# Patient Record
Sex: FEMALE | Race: WHITE | NOT HISPANIC OR LATINO | Employment: FULL TIME | URBAN - METROPOLITAN AREA
[De-identification: names, ages, dates, MRNs, and addresses within clinical notes are randomized per-mention and may not be internally consistent; named-entity substitution may affect disease eponyms.]

---

## 2017-08-15 ENCOUNTER — HOSPITAL ENCOUNTER (INPATIENT)
Facility: CLINIC | Age: 57
LOS: 2 days | Discharge: HOME | DRG: 603 | End: 2017-08-18
Attending: EMERGENCY MEDICINE | Admitting: HOSPITALIST
Payer: COMMERCIAL

## 2017-08-15 ENCOUNTER — NURSE TRIAGE (OUTPATIENT)
Dept: NURSING | Facility: CLINIC | Age: 57
End: 2017-08-15

## 2017-08-15 DIAGNOSIS — Z96.651 PRESENCE OF RIGHT ARTIFICIAL KNEE JOINT: ICD-10-CM

## 2017-08-15 DIAGNOSIS — L03.115 CELLULITIS OF RIGHT LOWER EXTREMITY: ICD-10-CM

## 2017-08-15 DIAGNOSIS — L01.00 IMPETIGO: Primary | ICD-10-CM

## 2017-08-15 DIAGNOSIS — R23.8 VESICULAR ERUPTION OF SKIN: ICD-10-CM

## 2017-08-15 DIAGNOSIS — L30.9 DERMATITIS: ICD-10-CM

## 2017-08-15 LAB
ANION GAP SERPL CALCULATED.3IONS-SCNC: 6 MMOL/L (ref 3–14)
BASOPHILS # BLD AUTO: 0 10E9/L (ref 0–0.2)
BASOPHILS NFR BLD AUTO: 0.4 %
BUN SERPL-MCNC: 13 MG/DL (ref 7–30)
CALCIUM SERPL-MCNC: 7.3 MG/DL (ref 8.5–10.1)
CHLORIDE SERPL-SCNC: 114 MMOL/L (ref 94–109)
CO2 SERPL-SCNC: 23 MMOL/L (ref 20–32)
CREAT SERPL-MCNC: 0.62 MG/DL (ref 0.52–1.04)
CRP SERPL-MCNC: 3.7 MG/L (ref 0–8)
DIFFERENTIAL METHOD BLD: NORMAL
EOSINOPHIL # BLD AUTO: 0.3 10E9/L (ref 0–0.7)
EOSINOPHIL NFR BLD AUTO: 5.2 %
ERYTHROCYTE [DISTWIDTH] IN BLOOD BY AUTOMATED COUNT: 13.1 % (ref 10–15)
ERYTHROCYTE [SEDIMENTATION RATE] IN BLOOD BY WESTERGREN METHOD: 69 MM/H (ref 0–30)
GFR SERPL CREATININE-BSD FRML MDRD: >90 ML/MIN/1.7M2
GLUCOSE SERPL-MCNC: 72 MG/DL (ref 70–99)
HCT VFR BLD AUTO: 37.3 % (ref 35–47)
HGB BLD-MCNC: 12.1 G/DL (ref 11.7–15.7)
IMM GRANULOCYTES # BLD: 0 10E9/L (ref 0–0.4)
IMM GRANULOCYTES NFR BLD: 0.2 %
LYMPHOCYTES # BLD AUTO: 1.9 10E9/L (ref 0.8–5.3)
LYMPHOCYTES NFR BLD AUTO: 34.3 %
MCH RBC QN AUTO: 30.7 PG (ref 26.5–33)
MCHC RBC AUTO-ENTMCNC: 32.4 G/DL (ref 31.5–36.5)
MCV RBC AUTO: 95 FL (ref 78–100)
MONOCYTES # BLD AUTO: 0.4 10E9/L (ref 0–1.3)
MONOCYTES NFR BLD AUTO: 6.4 %
NEUTROPHILS # BLD AUTO: 3 10E9/L (ref 1.6–8.3)
NEUTROPHILS NFR BLD AUTO: 53.5 %
NRBC # BLD AUTO: 0 10*3/UL
NRBC BLD AUTO-RTO: 0 /100
PLATELET # BLD AUTO: 286 10E9/L (ref 150–450)
POTASSIUM SERPL-SCNC: 3.1 MMOL/L (ref 3.4–5.3)
PROCALCITONIN SERPL-MCNC: <0.05 NG/ML
RBC # BLD AUTO: 3.94 10E12/L (ref 3.8–5.2)
SODIUM SERPL-SCNC: 143 MMOL/L (ref 133–144)
WBC # BLD AUTO: 5.6 10E9/L (ref 4–11)

## 2017-08-15 PROCEDURE — 85025 COMPLETE CBC W/AUTO DIFF WBC: CPT | Performed by: EMERGENCY MEDICINE

## 2017-08-15 PROCEDURE — 84145 PROCALCITONIN (PCT): CPT | Performed by: EMERGENCY MEDICINE

## 2017-08-15 PROCEDURE — 85652 RBC SED RATE AUTOMATED: CPT | Performed by: EMERGENCY MEDICINE

## 2017-08-15 PROCEDURE — 86140 C-REACTIVE PROTEIN: CPT | Performed by: EMERGENCY MEDICINE

## 2017-08-15 PROCEDURE — 99285 EMERGENCY DEPT VISIT HI MDM: CPT | Mod: Z6 | Performed by: EMERGENCY MEDICINE

## 2017-08-15 PROCEDURE — 99285 EMERGENCY DEPT VISIT HI MDM: CPT | Mod: 25 | Performed by: EMERGENCY MEDICINE

## 2017-08-15 PROCEDURE — 80048 BASIC METABOLIC PNL TOTAL CA: CPT | Performed by: EMERGENCY MEDICINE

## 2017-08-15 NOTE — IP AVS SNAPSHOT
[address] [address]    Phone:  [phone]                                       After Visit Summary   8/15/2017    Alissa Escoto    MRN: [id number]           After Visit Summary Signature Page     I have received my discharge instructions, and my questions have been answered. I have discussed any challenges I see with this plan with the nurse or doctor.    ..........................................................................................................................................  Patient/Patient Representative Signature      ..........................................................................................................................................  Patient Representative Print Name and Relationship to Patient    ..................................................               ................................................  Date                                            Time    ..........................................................................................................................................  Reviewed by Signature/Title    ...................................................              ..............................................  Date                                                            Time

## 2017-08-15 NOTE — LETTER
Transition Communication Hand-off for Care Transitions to Next Level of Care Provider    Name: Alissa Escoto  MRN #: [id number]  Primary Care Provider: INGRIS WALTERS     Primary Clinic: [address] [address]     Reason for Hospitalization:  Cellulitis of right lower extremity [L03.115]  Admit Date/Time: 8/15/2017  9:21 PM  Discharge Date: 8/18/17  Payor Source: Payor: UCARE / Plan: UCARE [place] / Product Type: HMO /     Readmission Assessment Measure (ZAMZAM) Risk Score/category: medium    Reason for Communication Hand-off Referral: Multiple providers/specialties    Discharge Plan: home       Concern for non-adherence with plan of care:   Y/N no  Already enrolled in Tele-monitoring program and name of program:  no  Follow-up specialty is recommended: Yes    Follow-up plan:  No future appointments made.    Any outstanding tests or procedures:        Referrals     Future Labs/Procedures    Physical Therapy Referral     Comments:    [facility]: [facility] [place], operated by [facility] provides Physical Therapy evaluation and treatment and many specialty services across the [facility].  If requesting a specialty program, please choose from the list below.    If you have not heard from the scheduling office within 2 business days, please call [phone] for all locations.  Treatment: Evaluation & Treatment.  Resume previous orders  Special Instructions/Modalities: none  Special Programs: None    Please be aware that coverage of these services is subject to the terms and limitations of your health insurance plan.  Call member services at your health plan with any benefit or coverage questions.            Key Recommendations:  See AVS    Ariana Henning RN, BSN  Care Coordinator [facility] [place] [address]  Pager: [phone]  Phone: [phone]    AVS/Discharge Summary is the source of truth; this is a helpful guide for improved communication of patient story

## 2017-08-15 NOTE — IP AVS SNAPSHOT
MRN:[id number]                      After Visit Summary   8/15/2017    Alissa Escoto    MRN: [id number]           Thank you!     Thank you for choosing [facility] for your care. Our goal is always to provide you with excellent care. Hearing back from our patients is one way we can continue to improve our services. Please take a few minutes to complete the written survey that you may receive in the mail after you visit with us. Thank you!        Patient Information     Date Of Birth          1960        Designated Caregiver       Most Recent Value    Caregiver    Will someone help with your care after discharge? yes    Name of designated caregiver Mónica La    Phone number of caregiver [phone]    Caregiver address Unknown      About your hospital stay     You were admitted on:  August 16, 2017 You last received care in the:  [address] [facility]    You were discharged on:  August 18, 2017        Reason for your hospital stay       Dear Alissa Escoto    Your were hospitalized at [facility] with a contact dermatitis and bacterial infection on the right leg. You were treated with topical antibiotics and steroid cream. Overyour hospitalization you improved and today you are ready to be discharged. If you develop fever, shortness of breath, light headedness, worsening rash, or chest pain please seek medical attention.    We are suggesting the following medication changes:  1) Apply Dankins solution on a soaked gauze for 20 minutes, two times daily on your affected skin.   2) Apply mupirocin ointment on your skin lesions three times a day until the lesions resolve.  3) Apply triamcinolone ointment two times daily, until your lesions resolve.   4) Apply Aquaphor daily on to your affected skin.     Please set up an appointment with:  Your Primary Care doctor in 1 week for hospital follow up     It was a pleasure meeting with you today. Thank you for allowing me and my  team the privilege of caring for you today. You are the reason we are here, and I truly hope we provided you with the excellent service you deserve. Please let us know if there is anything else we can do for you so that we can be sure you are leaving completely satisfied with your care experience.    Take care!  Arnaldo Martinez MD  Department of [facility]  [facility]                  Who to Call     For medical emergencies, please call [phone].  For non-urgent questions about your medical care, please call your primary care provider or clinic, [phone]          Attending Provider     Provider Specialty    Lidia Garrido MD Emergency Medicine    Khot, Elias Davenport MD Internal Medicine    Jeter, Minh Singh MD Internal Medicine       Primary Care Provider Office Phone # Fax #    Ingris Levi [phone]      After Care Instructions     Activity       Your activity upon discharge: activity as tolerated            Diet       Follow this diet upon discharge:       Combination Diet Regular Diet Adult                  Follow-up Appointments     Adult [facility] Follow-up and recommended labs and tests       Follow up with primary care provider, INGRIS LEVI, within 7 days for hospital follow- up.        Appointments on [place] and/or [facility] (with [facility] or [facility] provider or service). Call [phone] if you haven't heard regarding these appointments within 7 days of discharge.                  Additional Services     Physical Therapy Referral       Critical access hospital: [facility] provides Physical Therapy evaluation and treatment and many specialty services across the [facility] system.  If requesting a specialty program, please choose from the list below.    If you have not heard from the scheduling office within 2 business days, please call [phone] for all locations.  Treatment: Evaluation & Treatment.  Resume  "previous orders  Special Instructions/Modalities: none  Special Programs: None    Please be aware that coverage of these services is subject to the terms and limitations of your health insurance plan.  Call member services at your health plan with any benefit or coverage questions.                  Pending Results     No orders found from 8/13/2017 to 8/16/2017.            Statement of Approval     Ordered          08/18/17 1408  I have reviewed and agree with all the recommendations and orders detailed in this document.  EFFECTIVE NOW     Approved and electronically signed by:  Arnaldo Martinez MD             Admission Information     Date & Time Provider Department Dept. Phone    8/15/2017 Minh Jeter MD [address] [facility] [place] [phone]      Your Vitals Were     Blood Pressure Pulse Temperature Respirations Height Weight    114/67 (BP Location: Right arm) 86 97  F (36.1  C) (Oral) 16 1.676 m (5' 6\") 106 kg (233 lb 11 oz)    Pulse Oximetry BMI (Body Mass Index)                98% 37.72 kg/m2          Care EveryWhere ID     This is your Care EveryWhere ID. This could be used by other organizations to access your [facility] medical records  [id number]        Equal Access to Services     LEXI STEINER : Hadii aubrie Rocha, wadon lim, qayblorena kazully mora, farideh paula . So [facility] [phone].    ATENCIÓN: Si habla español, tiene a wilkinson disposición servicios gratuitos de asistencia lingüística. [facility] [phone].    We comply with applicable federal civil rights laws and [place] laws. We do not discriminate on the basis of race, color, national origin, age, disability sex, sexual orientation or gender identity.               Review of your medicines      START taking        Dose / Directions    mineral oil-hydrophilic petrolatum   Used for:  Impetigo        Apply to right leg daily   Quantity:  50 g   Refills:  0       mupirocin 2 % ointment   Commonly " known as:  BACTROBAN   Used for:  Impetigo        Apply a thin layer  to affected skin 3 times a day, until lesions resolve   Quantity:  15 g   Refills:  0       sodium hypochlorite external solution   Commonly known as:  DAKINS   Used for:  Impetigo        Apply soaked gauze to the affected areas on the right leg for 20 minutes, two times daily until lesions resolve   Quantity:  473 mL   Refills:  0       triamcinolone 0.1 % ointment   Commonly known as:  KENALOG   Used for:  Dermatitis        Apply to the affected skin on the right knee two times daily until lesions resolve.   Quantity:  15 g   Refills:  0         CONTINUE these medicines which have NOT CHANGED        Dose / Directions    CLONAZEPAM PO        Dose:  0.5-1 mg   Take 0.5-1 mg by mouth 2 times daily as needed for anxiety   Refills:  0       LEVOTHYROXINE SODIUM PO        Dose:  88 mcg   Take 88 mcg by mouth daily   Refills:  0       SIMVASTATIN PO        Dose:  40 mg   Take 40 mg by mouth At Bedtime   Refills:  0       TRAMADOL HCL PO        Dose:   mg   Take  mg by mouth 4 times daily as needed   Refills:  0         STOP taking     CLINDAMYCIN HCL PO                Where to get your medicines      These medications were sent to [id number] - [place], [place] - [address]  [address] [address]     Phone:  [phone]     mineral oil-hydrophilic petrolatum    mupirocin 2 % ointment    sodium hypochlorite external solution    triamcinolone 0.1 % ointment                Protect others around you: Learn how to safely use, store and throw away your medicines at www.disposemymeds.org.             Medication List: This is a list of all your medications and when to take them. Check marks below indicate your daily home schedule. Keep this list as a reference.      Medications           Morning Afternoon Evening Bedtime As Needed    CLONAZEPAM PO   Take 0.5-1 mg by mouth 2 times daily as needed for anxiety                                 LEVOTHYROXINE SODIUM PO   Take 88 mcg by mouth daily   Last time this was given:  88 mcg on 8/18/2017  7:58 AM                                mineral oil-hydrophilic petrolatum   Apply to right leg daily   Last time this was given:  8/18/2017  8:03 AM                                mupirocin 2 % ointment   Commonly known as:  BACTROBAN   Apply a thin layer  to affected skin 3 times a day, until lesions resolve   Last time this was given:  8/18/2017  8:03 AM                                SIMVASTATIN PO   Take 40 mg by mouth At Bedtime                                sodium hypochlorite external solution   Commonly known as:  DAKINS   Apply soaked gauze to the affected areas on the right leg for 20 minutes, two times daily until lesions resolve   Last time this was given:  8/18/2017  8:05 AM                                TRAMADOL HCL PO   Take  mg by mouth 4 times daily as needed   Last time this was given:  25 mg on 8/16/2017 10:55 PM                                triamcinolone 0.1 % ointment   Commonly known as:  KENALOG   Apply to the affected skin on the right knee two times daily until lesions resolve.   Last time this was given:  8/18/2017  8:02 AM

## 2017-08-16 ENCOUNTER — APPOINTMENT (OUTPATIENT)
Dept: ULTRASOUND IMAGING | Facility: CLINIC | Age: 57
DRG: 603 | End: 2017-08-16
Attending: EMERGENCY MEDICINE
Payer: COMMERCIAL

## 2017-08-16 ENCOUNTER — APPOINTMENT (OUTPATIENT)
Dept: GENERAL RADIOLOGY | Facility: CLINIC | Age: 57
DRG: 603 | End: 2017-08-16
Attending: STUDENT IN AN ORGANIZED HEALTH CARE EDUCATION/TRAINING PROGRAM
Payer: COMMERCIAL

## 2017-08-16 PROBLEM — L03.90 CELLULITIS: Status: ACTIVE | Noted: 2017-08-16

## 2017-08-16 LAB
ANION GAP SERPL CALCULATED.3IONS-SCNC: 7 MMOL/L (ref 3–14)
BUN SERPL-MCNC: 14 MG/DL (ref 7–30)
CALCIUM SERPL-MCNC: 9.4 MG/DL (ref 8.5–10.1)
CHLORIDE SERPL-SCNC: 108 MMOL/L (ref 94–109)
CO2 SERPL-SCNC: 28 MMOL/L (ref 20–32)
CREAT SERPL-MCNC: 0.71 MG/DL (ref 0.52–1.04)
ERYTHROCYTE [DISTWIDTH] IN BLOOD BY AUTOMATED COUNT: 13.3 % (ref 10–15)
GFR SERPL CREATININE-BSD FRML MDRD: 84 ML/MIN/1.7M2
GLUCOSE SERPL-MCNC: 101 MG/DL (ref 70–99)
GRAM STN SPEC: NORMAL
HCT VFR BLD AUTO: 36.4 % (ref 35–47)
HGB BLD-MCNC: 11.7 G/DL (ref 11.7–15.7)
MCH RBC QN AUTO: 30.2 PG (ref 26.5–33)
MCHC RBC AUTO-ENTMCNC: 32.1 G/DL (ref 31.5–36.5)
MCV RBC AUTO: 94 FL (ref 78–100)
PLATELET # BLD AUTO: 280 10E9/L (ref 150–450)
POTASSIUM SERPL-SCNC: 4 MMOL/L (ref 3.4–5.3)
RBC # BLD AUTO: 3.87 10E12/L (ref 3.8–5.2)
SODIUM SERPL-SCNC: 142 MMOL/L (ref 133–144)
SPECIMEN SOURCE: NORMAL
SPECIMEN SOURCE: NORMAL
WBC # BLD AUTO: 5.3 10E9/L (ref 4–11)

## 2017-08-16 PROCEDURE — 25000132 ZZH RX MED GY IP 250 OP 250 PS 637: Performed by: STUDENT IN AN ORGANIZED HEALTH CARE EDUCATION/TRAINING PROGRAM

## 2017-08-16 PROCEDURE — 93971 EXTREMITY STUDY: CPT | Mod: RT

## 2017-08-16 PROCEDURE — 12000008 ZZH R&B INTERMEDIATE UMMC

## 2017-08-16 PROCEDURE — 85027 COMPLETE CBC AUTOMATED: CPT | Performed by: STUDENT IN AN ORGANIZED HEALTH CARE EDUCATION/TRAINING PROGRAM

## 2017-08-16 PROCEDURE — 80048 BASIC METABOLIC PNL TOTAL CA: CPT | Performed by: STUDENT IN AN ORGANIZED HEALTH CARE EDUCATION/TRAINING PROGRAM

## 2017-08-16 PROCEDURE — 87205 SMEAR GRAM STAIN: CPT | Performed by: EMERGENCY MEDICINE

## 2017-08-16 PROCEDURE — 87070 CULTURE OTHR SPECIMN AEROBIC: CPT | Performed by: EMERGENCY MEDICINE

## 2017-08-16 PROCEDURE — 87070 CULTURE OTHR SPECIMN AEROBIC: CPT | Performed by: DERMATOLOGY

## 2017-08-16 PROCEDURE — 25000128 H RX IP 250 OP 636: Performed by: EMERGENCY MEDICINE

## 2017-08-16 PROCEDURE — 36415 COLL VENOUS BLD VENIPUNCTURE: CPT | Performed by: STUDENT IN AN ORGANIZED HEALTH CARE EDUCATION/TRAINING PROGRAM

## 2017-08-16 PROCEDURE — 25000128 H RX IP 250 OP 636: Performed by: STUDENT IN AN ORGANIZED HEALTH CARE EDUCATION/TRAINING PROGRAM

## 2017-08-16 PROCEDURE — 40000986 XR KNEE RT 1 /2 VW: Mod: RT

## 2017-08-16 PROCEDURE — 96365 THER/PROPH/DIAG IV INF INIT: CPT | Performed by: EMERGENCY MEDICINE

## 2017-08-16 PROCEDURE — 25000132 ZZH RX MED GY IP 250 OP 250 PS 637: Performed by: EMERGENCY MEDICINE

## 2017-08-16 PROCEDURE — 99223 1ST HOSP IP/OBS HIGH 75: CPT | Mod: AI | Performed by: INTERNAL MEDICINE

## 2017-08-16 RX ORDER — ALBUTEROL SULFATE 0.83 MG/ML
2.5 SOLUTION RESPIRATORY (INHALATION) EVERY 6 HOURS PRN
Status: DISCONTINUED | OUTPATIENT
Start: 2017-08-16 | End: 2017-08-18 | Stop reason: HOSPADM

## 2017-08-16 RX ORDER — LEVOTHYROXINE SODIUM 88 UG/1
88 TABLET ORAL DAILY
Status: DISCONTINUED | OUTPATIENT
Start: 2017-08-16 | End: 2017-08-18 | Stop reason: HOSPADM

## 2017-08-16 RX ORDER — OXYCODONE HYDROCHLORIDE 5 MG/1
5 TABLET ORAL ONCE
Status: DISCONTINUED | OUTPATIENT
Start: 2017-08-16 | End: 2017-08-18 | Stop reason: HOSPADM

## 2017-08-16 RX ORDER — AMOXICILLIN 250 MG
1-2 CAPSULE ORAL 2 TIMES DAILY PRN
Status: DISCONTINUED | OUTPATIENT
Start: 2017-08-16 | End: 2017-08-18 | Stop reason: HOSPADM

## 2017-08-16 RX ORDER — NALOXONE HYDROCHLORIDE 0.4 MG/ML
.1-.4 INJECTION, SOLUTION INTRAMUSCULAR; INTRAVENOUS; SUBCUTANEOUS
Status: DISCONTINUED | OUTPATIENT
Start: 2017-08-16 | End: 2017-08-16

## 2017-08-16 RX ORDER — POTASSIUM CHLORIDE 1.5 G/1.58G
20 POWDER, FOR SOLUTION ORAL ONCE
Status: COMPLETED | OUTPATIENT
Start: 2017-08-16 | End: 2017-08-16

## 2017-08-16 RX ORDER — NALOXONE HYDROCHLORIDE 0.4 MG/ML
.1-.4 INJECTION, SOLUTION INTRAMUSCULAR; INTRAVENOUS; SUBCUTANEOUS
Status: DISCONTINUED | OUTPATIENT
Start: 2017-08-16 | End: 2017-08-18 | Stop reason: HOSPADM

## 2017-08-16 RX ORDER — DIPHENHYDRAMINE HCL 25 MG
25 CAPSULE ORAL ONCE
Status: COMPLETED | OUTPATIENT
Start: 2017-08-16 | End: 2017-08-16

## 2017-08-16 RX ORDER — TRIAMCINOLONE ACETONIDE 1 MG/G
OINTMENT TOPICAL 2 TIMES DAILY
Status: DISCONTINUED | OUTPATIENT
Start: 2017-08-16 | End: 2017-08-18 | Stop reason: HOSPADM

## 2017-08-16 RX ORDER — MINERAL OIL/HYDROPHIL PETROLAT
OINTMENT (GRAM) TOPICAL DAILY
Status: DISCONTINUED | OUTPATIENT
Start: 2017-08-16 | End: 2017-08-18 | Stop reason: HOSPADM

## 2017-08-16 RX ORDER — DIPHENHYDRAMINE HCL 25 MG
25 CAPSULE ORAL EVERY 6 HOURS PRN
Status: DISCONTINUED | OUTPATIENT
Start: 2017-08-16 | End: 2017-08-18 | Stop reason: HOSPADM

## 2017-08-16 RX ORDER — ACETAMINOPHEN 325 MG/1
650 TABLET ORAL EVERY 4 HOURS PRN
Status: DISCONTINUED | OUTPATIENT
Start: 2017-08-16 | End: 2017-08-18 | Stop reason: HOSPADM

## 2017-08-16 RX ORDER — IBUPROFEN 200 MG
400 TABLET ORAL ONCE
Status: DISCONTINUED | OUTPATIENT
Start: 2017-08-16 | End: 2017-08-18 | Stop reason: HOSPADM

## 2017-08-16 RX ADMIN — LEVOTHYROXINE SODIUM 88 MCG: 88 TABLET ORAL at 08:41

## 2017-08-16 RX ADMIN — POTASSIUM CHLORIDE 20 MEQ: 1.5 POWDER, FOR SOLUTION ORAL at 01:08

## 2017-08-16 RX ADMIN — TRAMADOL HYDROCHLORIDE 25 MG: 50 TABLET, FILM COATED ORAL at 10:45

## 2017-08-16 RX ADMIN — VANCOMYCIN HYDROCHLORIDE 1750 MG: 10 INJECTION, POWDER, LYOPHILIZED, FOR SOLUTION INTRAVENOUS at 02:28

## 2017-08-16 RX ADMIN — TRAMADOL HYDROCHLORIDE 25 MG: 50 TABLET, FILM COATED ORAL at 15:50

## 2017-08-16 RX ADMIN — DIPHENHYDRAMINE HYDROCHLORIDE 25 MG: 25 CAPSULE ORAL at 17:07

## 2017-08-16 RX ADMIN — ENOXAPARIN SODIUM 40 MG: 40 INJECTION SUBCUTANEOUS at 08:41

## 2017-08-16 RX ADMIN — VANCOMYCIN HYDROCHLORIDE 1750 MG: 10 INJECTION, POWDER, LYOPHILIZED, FOR SOLUTION INTRAVENOUS at 14:39

## 2017-08-16 RX ADMIN — TRAMADOL HYDROCHLORIDE 25 MG: 50 TABLET, FILM COATED ORAL at 22:55

## 2017-08-16 ASSESSMENT — ENCOUNTER SYMPTOMS
HEMATURIA: 0
ROS SKIN COMMENTS: SEE HPI
DYSURIA: 0
POLYDIPSIA: 0
HEADACHES: 0
BRUISES/BLEEDS EASILY: 0
NAUSEA: 0
LIGHT-HEADEDNESS: 0
DIZZINESS: 0
NECK PAIN: 0
SHORTNESS OF BREATH: 0
TROUBLE SWALLOWING: 0
VOICE CHANGE: 0
DYSPHORIC MOOD: 0
CONSTIPATION: 0
WEAKNESS: 0
NERVOUS/ANXIOUS: 0
SORE THROAT: 0
FREQUENCY: 0
ABDOMINAL PAIN: 0
PALPITATIONS: 0
NUMBNESS: 0
DIARRHEA: 0
FEVER: 0
EYE PAIN: 0
VOMITING: 0
ADENOPATHY: 0
BACK PAIN: 0
BLOOD IN STOOL: 0
CHILLS: 0
DIAPHORESIS: 0
COUGH: 0

## 2017-08-16 NOTE — SUMMARY OF CARE
Patient arrived to unit from ED at 0330. Pt arrived with the following items: blue sergio shorts, socks, bra, gray shirt, purse (cell phone and charging cord, medical cards, wallet), blue cane, and glasses.

## 2017-08-16 NOTE — CONSULTS
[facility] Orthopedic Consultation    Alissa Escoto MRN# [id number]   Age: 57 year old YOB: 1960     Date of Admission:  8/15/2017    Reason for consult:  right knee pain with blisters        Requesting physician: Juan Chaudhari MD                   Impression and Recommendation (Resident / Clinician):   Impression:  57-year-old female status post right total knee arthroplasty on 07/05/17 at [facility] with right knee pain and multiple superficial blisters. Given healed incision and normal inflammatory markers, no concern for prosthetic joint infection. Swelling, pain, and stiffness are all within normal limits after TKA 5 weeks ago.     Recomendations:  -WBAT RLE  -R knee ROM with PT  -PT/OT  -wound cares for blisters per primary team  -FU Dr. Burnett outpatient as scheduled    Patient discussed with Dr. Damon. Patient s/p R TKA 5 weeks ago with pain, swelling, erythema, stiffness of knee. Incision healing well with no drainage, normal inflammatory markers. Normal post operative course, no concern for prosthetic infection. Superficial blisters can be managed by wound care team and will likely resolve with dressing changes and elevation/compression dressing to decrease swelling. FU outpatient with Dr. Burnett with [facility] as scheduled.    Lexie Villagran MD          Chief Complaint:   Right knee plain and skin blisters      History is obtained from the patient         History of Present Illness (Resident / Clinician):   This patient is a 57 year old female with right total knee arthroplasty on (07/05/17 by Dr. Burnett at [facility])  who presents with right knee pain and multiple blisters. Patient reported that knee was red and irritated immediately after surgery. She attributes this to the use of a Betadine wash when she has a known Betadine allergy. She reported doing well in the postoperative period and was discharged on 07/10/17 with no issues. On 07/11/17 she noted a blister  developing distal and medial to her incision. In the subsequent weeks multiple other blisters began to form. Thress weeks ago she was seen in the emergency department she was told that she would did not have a joint infection. Two weeks ago she was seen by her primary care doctor who cultured one of the blisters. The culture grew methicillin sensitive staph aureus and she was started on 900 of clindamycin daily. She is continuing to take those antibiotics through today. Per patient, she was seen by Dr. Burnett yesterday who did not have concern for joint infection.     Patient comes in today after a new blister had formed distal to the incision site. She is concerned that she has not been getting the medical care that she needs desires a second opinion. She reports ongoing superficial pain. Otherwise, her pain is very similar to the postoperative pain she experienced with her left knee total arthroplasty. She reports slightly decreased range of motion in the knee. She is continued to ambulate extensively on the leg fully minimal pain. She denies systemic symptoms such as fevers, aches, chills, myalgias.          Past Medical History:   Hashimoto's thyroid disease, bronchiectasis, high cholesterol          Past Surgical History:   Hysterectomy in 1990  Bladder reconstruction 1995  Cholecystectomy 1996  Left unicompartmental knee replacement 2014  Revision left total knee arthroplasty 2016  Right total knee arthroplasty - July 2017         Social History:   Anticipating starting a new job working with [employer] on 28 August. Patient anticipating that I will be fairly physical. Concerned about being able to start work. Patient does not smoke.    Social History   Substance Use Topics     Smoking status: Never Smoker     Smokeless tobacco: Not on file     Alcohol use No          Family History:   No family history of anesthesia, bleeding or clotting complications.          Immunizations:   Immunizations are  "up to date          Allergies:     Allergies   Allergen Reactions     Sulfa Drugs Anaphylaxis     Betadine [Povidone Iodine] Rash     Blisters     Iodine Rash             Medications:     Current Facility-Administered Medications   Medication     vancomycin (VANCOCIN) 1,750 mg in NaCl 0.9 % 500 mL intermittent infusion     levothyroxine (SYNTHROID/LEVOTHROID) tablet 88 mcg     enoxaparin (LOVENOX) injection 40 mg     acetaminophen (TYLENOL) tablet 650 mg     naloxone (NARCAN) injection 0.1-0.4 mg     senna-docusate (SENOKOT-S;PERICOLACE) 8.6-50 MG per tablet 1-2 tablet     traMADol (ULTRAM) half-tab 25 mg     diphenhydrAMINE (BENADRYL) capsule 25 mg     albuterol neb solution 2.5 mg             Review of Systems:   CONSTITUTIONAL:  negative for  fevers, chills, sweats, fatigue  HEENT:  negative for nasal congestion, sore throat   RESPIRATORY:  negative for  dyspnea, wheezing, chest pain and cough  CARDIOVASCULAR:  negative for  chest pain, palpitations, orthopnea, edema  GASTROINTESTINAL:  negative for nausea, vomiting, diarrhea, abdominal pain  GENITOURINARY:  negative for frequency, dysuria  HEMATOLOGIC/LYMPHATIC:  negative for easy bruising, bleeding  ALLERGIC/IMMUNOLOGIC:  History of allergic reactions, rashes  MUSCULOSKELETAL:  See history of present illness         Physical Exam:   Patient Vitals for the past 24 hrs:   BP Temp Temp src Pulse Heart Rate Resp SpO2 Height Weight   08/16/17 0730 122/67 97.4  F (36.3  C) Oral 76 - 16 96 % - -   08/16/17 0427 117/65 96.7  F (35.9  C) Oral - 79 16 93 % - -   08/16/17 0335 121/79 97  F (36.1  C) Oral 90 - 16 95 % 1.676 m (5' 6\") 103.8 kg (228 lb 14.4 oz)   08/16/17 0300 140/83 - - - - - 93 % - -   08/16/17 0230 130/81 - - - - - - - -   08/16/17 0200 136/82 - - - - - 95 % - -   08/16/17 0130 129/76 - - - - - 90 % - -   08/16/17 0100 119/88 - - - - - 100 % - -   08/15/17 2330 126/84 - - - - - 100 % - -   08/15/17 2300 109/70 - - - - - 98 % - -   08/15/17 2230 120/86 - - " "- - - 97 % - -   08/15/17 2200 (!) 127/91 - - - - - 96 % - -   08/15/17 2130 134/90 - - - - - 100 % - -   08/15/17 2111 (!) 141/91 98.2  F (36.8  C) Oral - 89 14 94 % 1.727 m (5' 8\") 104.3 kg (230 lb)     All vitals have been reviewed  General: awake, alert, cooperative, no apparent distress  HEENT: normocephalic, atraumatic  Respiratory: breathing non-labored, no wheezing  Cardiovascular: peripheral pulses 2+ and symmetric, capillary refill < 2sec, skin warm an well perfused, no edema  Skin: Multiple bullous lesions in various stages of healing - measuring several centimeters in diameter, some lesions well crusted over with scab, 1 new lesion distal to incision site draining clear fluid, no drainage or erythema of incision site  Musculoskeletal-L LE: knee flexion to 70  with minimal pain, expected swelling for 1 month postsurgery, minimal tenderness to palpation along joint line and patella, neurovascularly intact            Data:   CRP: 3.7 (08/16/17)  ESR: 69 (08/16/17)  WBC:5.3    Xr Knee Right 1/2 Views  Result Date: 8/16/2017  IMPRESSION: Postsurgical changes of a right total knee arthroplasty, without complication. RUFINO MONTESINOS MD    Us Lower Extremity Venous Duplex Right  Result Date: 8/16/2017  IMPRESSION: 1.  No evidence of right lower extremity deep venous thrombosis. I have personally reviewed the examination and initial interpretation and I agree with the findings. MD KENAN RAMAN MD  Orthopaedic Surgery Intern, G1  [phone]          "

## 2017-08-16 NOTE — PLAN OF CARE
Problem: Goal Outcome Summary  Goal: Goal Outcome Summary  Pt arrived to unit from ED at 0330 with dx of worsening right knee blisters following TKA. AAO. VSS. Up ind with cane. Completed IV vancomycin from ED in right PIV. Comfortably manageable pain rating it 3/10 per pt, prn tramadol available to give per pt request. Right knee blisters intact, with exception of the right inner one leaking scant serous fluid. Area reddened and warm to touch. Pt also c/o of mild itching in chest and back of neck; CC notified and benadryl ordered. Dermatology consult placed. Oriented to room and call light.  R: Will continue to monitor

## 2017-08-16 NOTE — PROVIDER NOTIFICATION
"Pagelizeth Bishop 5: Pt with acute itching in area of knee with spreading dermatitis. Shooting pain unrelieved with q6h Ultram. Can she have Benadryl, something for quicker pain relief?  F/u call: plz have pt try Tylenol    Page M5 again: per pt she can't take Tylenol because \"it makes her heart race\".  F/u call: Ok to take Benadryl for itching, will order 1-time dose of Oxy.    2230 Pagelizeth Bishop cross cover: Pt c/o headache, states she cannot take Tylenol, refused Oxy ordered, thinks headache was caused by Ultram,  is requesting ibuprofen.  "

## 2017-08-16 NOTE — ED NOTES
Right knee surgery July 5, 2017.  Since, patient has gotten blisters/rash periodically. Swelling to right knee starting today.  Allergic to iodine/betadine, concerned they may have used it during her surgery.

## 2017-08-16 NOTE — TELEPHONE ENCOUNTER
Caller is wishing to be seen in ED at [facility];  Reports  non healing blistering rash from staph infection on lower leg that appeared after total knee surgery 07/05/17 at another facility. Has been seen by surgeon, infectious disease and is on clindamycin. Referred to derm but can't get in for weeks. Not diabetic. No current fevers but leg is swollen tender and red around blisters and open ulcers where blisters have broken open. Desires to change to [facility] system and is seeking instructions on how to get to ED [facility].   Advised to proceed as desired and given driving directions to ED.   Reason for Disposition    Sounds like a serious complication to the triager    Additional Information    Wound infection suspected (in traumatic or surgical wound)    Surgical wound infection suspected (post-op)    Protocols used: POST-OP INCISION SYMPTOMS-ADULT-AH, SORES-ADULT-, WOUND INFECTION-ADULT-AH  Alessandra Reynaga RN  FNA

## 2017-08-16 NOTE — CONSULTS
[facility] Inpatient Consult Dermatology Note    Impression/Plan:  1. Allergic contact dermatitis with secondary bullous impetigo: The patient appears to have two processes affecting the skin over her right knee. She has a background of light pink scaly papules consistent with eczematous dermatitis, likely allergic contact in origin based on history. Additionally, the patient has prominent bullae with purulent fluid and honey colored crusting, indicative of bullous impetigo. Likely, the dermatitis created barrier dysfunction predisposing her to staph secondary infection. As such, treatment should be geared toward the dermatitis and infective component as below. A skin culture was collected for sensitivities.     Skin culture collected, results pending    Begin soaks with Dakin's solution to affected area on the right knee for 20 minutes twice daily. If Dakin's is unavailable, may use sodium hypochlorite (plain bleach) 1-2 teaspoons per gallon of lukewarm water, soak gauze and apply to right knee.    Apply triamcinolone 0.1% ointment to rash on right twice daily    Apply Aquaphor or white petrolatum to remainder of right knee daily      Thank you for the dermatology consultation. Please do not hesitate to contact the dermatology resident/faculty on call for any additional questions or concerns. We will continue to follow.     Navid Varner  Dermatology Resident    I have seen and examined this patient and agree with the assessment and plan as documented in the resident's note.    Paluino Armijo MD  Dermatology Attending        Dermatology Problem List:  1. Allergic contact dermatitis  2. Bullous impetigo    Date of Admission: Aug 15, 2017   Encounter Date: 8/16/2017     Reason for Consultation:   Blistering rash on right knee    History of Present Illness:  Ms. Alissa Escoto is a 57 year old female who underwent right knee surgery 7/5/17. Shortly afterward she developed an itchy rash that has  "persisted in the area around her surgical incision. Since onset, this has worsened to the point where she has had multiple blisters in the affected area. She was seen by her PCP for this about 2 weeks ago, and a culture was taken from blister fluid that grew MSSA. The patient was started on clindamycin, but has had no improvement. She was seen in the ED for this overnight and admitted for concern of possible infection in relation to her recent surgery. The patient describes a history of allergy to betadine, but has not had a reaction similar to this before. She is otherwise feeling well and has no rash or other blister elsewhere.     Past Medical History:   Patient Active Problem List   Diagnosis     Cellulitis     History reviewed. No pertinent past medical history.  History reviewed. No pertinent surgical history.      Social History:    Family History:  There is no family history of skin cancer.    Medications:  Current Facility-Administered Medications   Medication     vancomycin (VANCOCIN) 1,750 mg in NaCl 0.9 % 500 mL intermittent infusion     levothyroxine (SYNTHROID/LEVOTHROID) tablet 88 mcg     enoxaparin (LOVENOX) injection 40 mg     acetaminophen (TYLENOL) tablet 650 mg     naloxone (NARCAN) injection 0.1-0.4 mg     senna-docusate (SENOKOT-S;PERICOLACE) 8.6-50 MG per tablet 1-2 tablet     traMADol (ULTRAM) half-tab 25 mg     diphenhydrAMINE (BENADRYL) capsule 25 mg     albuterol neb solution 2.5 mg          Allergies   Allergen Reactions     Sulfa Drugs Anaphylaxis     Betadine [Povidone Iodine] Rash     Blisters     Iodine Rash         Review of Systems:  -As per HPI      Physical exam:  Vitals: /71 (BP Location: Left arm)  Pulse 76  Temp 96.9  F (36.1  C) (Oral)  Resp 18  Ht 1.676 m (5' 6\")  Wt 103.9 kg (229 lb 1.6 oz)  SpO2 93%  BMI 36.98 kg/m2  GEN: This is a well developed, well-nourished male in no acute distress, in a pleasant mood.    SKIN: Focused examination of the bilateral lower " extremities was performed.  -Healing surgical incision over the right anterior knee  -Scattered patches of light pink scaly papules over the right medial knee and inferior to the knee  -Several large tense, partially ruptured bullae on the anterior and medial knee, some with a small amount of purulent material. There are also healing bullae with prominent honey colored crusting over the surface.  -No other lesions of concern on areas examined.     Laboratory:  Results for orders placed or performed during the hospital encounter of 08/15/17 (from the past 24 hour(s))   CBC with platelets differential   Result Value Ref Range    WBC 5.6 4.0 - 11.0 10e9/L    RBC Count 3.94 3.8 - 5.2 10e12/L    Hemoglobin 12.1 11.7 - 15.7 g/dL    Hematocrit 37.3 35.0 - 47.0 %    MCV 95 78 - 100 fl    MCH 30.7 26.5 - 33.0 pg    MCHC 32.4 31.5 - 36.5 g/dL    RDW 13.1 10.0 - 15.0 %    Platelet Count 286 150 - 450 10e9/L    Diff Method Automated Method     % Neutrophils 53.5 %    % Lymphocytes 34.3 %    % Monocytes 6.4 %    % Eosinophils 5.2 %    % Basophils 0.4 %    % Immature Granulocytes 0.2 %    Nucleated RBCs 0 0 /100    Absolute Neutrophil 3.0 1.6 - 8.3 10e9/L    Absolute Lymphocytes 1.9 0.8 - 5.3 10e9/L    Absolute Monocytes 0.4 0.0 - 1.3 10e9/L    Absolute Eosinophils 0.3 0.0 - 0.7 10e9/L    Absolute Basophils 0.0 0.0 - 0.2 10e9/L    Abs Immature Granulocytes 0.0 0 - 0.4 10e9/L    Absolute Nucleated RBC 0.0    Basic metabolic panel   Result Value Ref Range    Sodium 143 133 - 144 mmol/L    Potassium 3.1 (L) 3.4 - 5.3 mmol/L    Chloride 114 (H) 94 - 109 mmol/L    Carbon Dioxide 23 20 - 32 mmol/L    Anion Gap 6 3 - 14 mmol/L    Glucose 72 70 - 99 mg/dL    Urea Nitrogen 13 7 - 30 mg/dL    Creatinine 0.62 0.52 - 1.04 mg/dL    GFR Estimate >90 >60 mL/min/1.7m2    GFR Estimate If Black >90 >60 mL/min/1.7m2    Calcium 7.3 (L) 8.5 - 10.1 mg/dL   Erythrocyte sedimentation rate auto   Result Value Ref Range    Sed Rate 69 (H) 0 - 30 mm/h    CRP inflammation   Result Value Ref Range    CRP Inflammation 3.7 0.0 - 8.0 mg/L   Procalcitonin   Result Value Ref Range    Procalcitonin <0.05 ng/ml   Wound Culture Aerobic Bacterial   Result Value Ref Range    Specimen Description Wound     Culture Micro Culture negative monitoring continues    Gram stain   Result Value Ref Range    Specimen Description Wound     Gram Stain No organisms seen     Gram Stain No WBC's seen    Wound Culture Aerobic Bacterial   Result Value Ref Range    Specimen Description Wound     Culture Micro Culture negative monitoring continues    Gram stain   Result Value Ref Range    Specimen Description Wound     Gram Stain No organisms seen     Gram Stain Few  WBC'S seen      US Lower Extremity Venous Duplex Right    Narrative    EXAMINATION: DOPPLER VENOUS ULTRASOUND OF THE RIGHT LOWER EXTREMITY,  8/16/2017 1:29 AM     COMPARISON: None.    HISTORY: ? DVT    TECHNIQUE:  Gray-scale evaluation with compression, spectral flow, and  color Doppler assessment of the deep venous system of the right leg  from groin to knee, and then at the ankle.    FINDINGS:  In the right lower extremity, the common femoral, femoral, popliteal  and posterior tibial veins demonstrate normal compressibility and  blood flow.      Impression    IMPRESSION:  1.  No evidence of right lower extremity deep venous thrombosis.    I have personally reviewed the examination and initial interpretation  and I agree with the findings.    MAXX CARL MD   Basic metabolic panel   Result Value Ref Range    Sodium 142 133 - 144 mmol/L    Potassium 4.0 3.4 - 5.3 mmol/L    Chloride 108 94 - 109 mmol/L    Carbon Dioxide 28 20 - 32 mmol/L    Anion Gap 7 3 - 14 mmol/L    Glucose 101 (H) 70 - 99 mg/dL    Urea Nitrogen 14 7 - 30 mg/dL    Creatinine 0.71 0.52 - 1.04 mg/dL    GFR Estimate 84 >60 mL/min/1.7m2    GFR Estimate If Black >90 >60 mL/min/1.7m2    Calcium 9.4 8.5 - 10.1 mg/dL   CBC with platelets   Result Value Ref Range     WBC 5.3 4.0 - 11.0 10e9/L    RBC Count 3.87 3.8 - 5.2 10e12/L    Hemoglobin 11.7 11.7 - 15.7 g/dL    Hematocrit 36.4 35.0 - 47.0 %    MCV 94 78 - 100 fl    MCH 30.2 26.5 - 33.0 pg    MCHC 32.1 31.5 - 36.5 g/dL    RDW 13.3 10.0 - 15.0 %    Platelet Count 280 150 - 450 10e9/L   XR Knee Right 1/2 Views    Narrative    Exam: 2 views of the right knee dated 8/16/2017.    COMPARISON: None.    CLINICAL HISTORY: Right knee arthroplasty.    FINDINGS: AP and lateral views of the right knee were obtained.  Postsurgical changes of a right total knee arthroplasty. The hardware  appears intact. Small right knee joint effusion.      Impression    IMPRESSION: Postsurgical changes of a right total knee arthroplasty,  without complication.    MD Dr. Santo WELLS staffed the patient.    Staff Involved:  Resident(Navid Lozada)/Staff(as above)  Navid Varner MD  Dermatology resident, PGY-4  [phone]

## 2017-08-16 NOTE — PLAN OF CARE
Problem: Goal Outcome Summary  Goal: Goal Outcome Summary  Patient has four blisters that are on her rt knee. Two are scabbed over and the other two are just opening and draining yellow fluid. Patient had some sharp shooting pains in the rt knee and ultram helped. Had a rt knee xray. Appetite and drinking well. Patient up with her cane independent. Receives iv abx every 24 hours. P;cont to tanya

## 2017-08-16 NOTE — H&P
History and Physical  Internal Medicine      Alissa Escoto MRN:[id number] YOB: 1960  Date of Admission:8/15/2017  Primary care provider: Andrei Levi           Assessment and Plan:      Assessment & Plan:   Alissa Escoto is a 57 year old female with a history of an allergy to betadine, who had a TKA in beginning of July after which she had recurrent formation of tense bullae in the area on the arthroplasty, found to have MSSA in the blister fluid and completed almost a full course of clindamycin, admitted for management of recurrent blisters.      Tense bullous blisters- these blisters only started appearing after her TKA in the beginning of July, and are in the distribution of the wrap that she had on her knee as a prep for the TKA. Although this looks like allergic dermatitis, the course has been long enough without improvement, and the surgery was 7/5. Additionally the first blisters started forming on the 11th of July.  The tightness of the skin is limiting her ROM, and although she has no systemic symptoms of infection currently, the presence of hardware and the course of these blisters has been ongoing for long enough to warrant consideration of re-evaluation of the joint by orthopedic surgery. Scar from surgery healing well. WBC 5.6, pro-gloria <0.05. Wound was cultured today.   - f/u wound culture results  - continue IV vancomycin  - dermatology consult in the morning (order placed)  - consider orthopedic surgery consult in the morning (no order yet)  - home tramadol PRN for pain  - Tylenol PRN pain  - benadryl PRN for itching     Unilateral leg swelling- ultrasound venous doppler done in the ED shows no evidence of DVT. This leg swelling could be due to inflammatory processes affecting venous flow.  - continue to monitor  - DVT ppx with lovenox.     Bronchiectasis- has a diagnosis of bronchiectasis, and has a PRN albuterol. This has been stable without symptoms.   - continue to  monitor, reassess if symptomatic.   - home albuterol PRN     Hypothyroidism- on home levothyroxine  - continue home medication    Hyperlipidemia- is on a statin at home which she takes in PM. Unsure of dose, and it's not documented here. Pt will recheck in the morning and let us know.   - reorder atorvastatin when dosing is known.     CODE: Full  DVT: Lovenox  FEN: Regular diet  Disposition: pending medical management.     Rafaela Fleming MD  PGY1, Internal Medicine  Pager: [phone]    This patient will be staffed with morning attending.     HISTORY OF PRESENT ILLNESS:  Alissa Escoto is a 57 year old female with a history of hypothyroidism, hld, bronchiectasis, and TKA in the beginning of July 2017 at [facility], presents with recurrent tense blisters in the distribution of her right knee since the beginning of July after her arthroplasty.     She has a reported history of allergy to betadine, which she says she had only once on her abdomen but reacted with hypotension and passing out, after which it was documented as an allergy. In the case of her knee arthroplasty, she had a bandage on her leg before the surgery which acted as a skin prep/disinfectant, which she was unsure of how long it was on her leg, but there is concern that there was betadine in that wrap. Patient also notes she was washing the betadine off her leg after she got home.     She started getting redness and vesicular blisters the day after discharge (7/10-7/11). She went to clinic to get it evaluated, and had one drained-  it grew MSSA, so she was put on a course of clindamycin which she almost completed at the time of her admission here. She feels burning, tightness and itching in the area of the blisters. When they open up they drain clear-yellow, but never thick. She has not had any fevers or chills, lightheadedness, or any other signs of systemic infection.     Although she had decreased ROM of the knee, she reports it's not an inability  to bend the knee, it's just that she is afraid of bursting the tense blisters. Per her PT, she ranges max 95 degrees, but she does not demonstrate that ROM today. Does not have pain with flexion of the knee joint, just feels tightness in the skin. No change in sensation in the area distal to the redness and blisters.     She has tried benadryl with no results. She reports that steroids give her costochondritis so she doesn't take them.     She does report unilateral right leg swelling which is not characteristic for her. She has no pain in her calf or with walking.  She has no history of blood clots. She denies chest pain or trouble breathing.     Denies changes in BMs, nausea, vomiting, diarrhea, fevers, chills, dizziness.     PAST MEDICAL HISTORY:    History reviewed. No pertinent past medical history.  Bronchiectasis, ?enlarged aorta (no recent change)     History reviewed. No pertinent surgical history.     MEDICATIONS:  PTA Meds  Prior to Admission medications    Medication Sig Last Dose Taking? Auth Provider   CLINDAMYCIN HCL PO Take 300 mg by mouth 3 times daily 8/15/2017 at 1500 Yes Reported, Patient   LEVOTHYROXINE SODIUM PO Take 88 mcg by mouth daily 8/15/2017 at Unknown time Yes Reported, Patient   SIMVASTATIN PO Take by mouth At Bedtime 8/14/2017 at Unknown time Yes Reported, Patient      Current Meds    vancomycin (VANCOCIN) IV  1,750 mg Intravenous Q12H     Infusion Meds       ALLERGIES:    Allergies   Allergen Reactions     Sulfa Drugs Anaphylaxis     Betadine [Povidone Iodine] Rash     Blisters     Iodine Rash       REVIEW OF SYSTEMS:  A 10 point review of systems was negative except as noted above.    SOCIAL HISTORY:   Social History     Social History     Marital status:      Spouse name: N/A     Number of children: N/A     Years of education: N/A     Occupational History     Not on file.     Social History Main Topics     Smoking status: Never Smoker     Smokeless tobacco: Not on file      "Alcohol use No     Drug use: No     Sexual activity: Not on file     Other Topics Concern     Not on file     Social History Narrative     No narrative on file       FAMILY MEDICAL HISTORY:   No family history on file.    PHYSICAL EXAM:   /76  Temp 98.2  F (36.8  C) (Oral)  Resp 14  Ht 1.727 m (5' 8\")  Wt 104.3 kg (230 lb)  SpO2 90%  BMI 34.97 kg/m2         General: alert and no distress  Head: NC/AT  Eyes: non-icteric sclera, EOMI  Pulmonary: CTAB  CV: RRR, +s1/s2, no murmurs, equal strong bilateral DP pulses  GI: soft, non-tender, non-distended + bowel sounds  Skin: Tense blisters in the distribution of of the right knee with an erythematous base, some healing and scabbing over, some tiny vessicular, and one large golf ball sized tense yellow-esteban. See ED provider note for photos. Arthroplasty scar healing well.   MSK:   EXT: 1+ edema of RLE, no edema on left LE. No deformity.   Neuro: A&Ox3, no focal deficits    LABS:   CMP  Recent Labs  Lab 08/15/17  2216      POTASSIUM 3.1*   CHLORIDE 114*   CO2 23   ANIONGAP 6   GLC 72   BUN 13   CR 0.62   GFRESTIMATED >90   GFRESTBLACK >90   DEEDEE 7.3*     CBC  Recent Labs  Lab 08/15/17  2216   HGB 12.1   WBC 5.6   RBC 3.94   HCT 37.3   MCV 95   MCH 30.7   MCHC 32.4   RDW 13.1        INRNo lab results found in last 7 days.  ABGNo lab results found in last 7 days.   URINE STUDIES  No lab results found.  No lab results found.    IRON STUDIES  No lab results found.    IMAGING:  Venous LE doppler- no DVT.         "

## 2017-08-16 NOTE — PHARMACY-VANCOMYCIN DOSING SERVICE
Pharmacy Vancomycin Initial Note  Date of Service 2017  Patient's  1960  57 year old, female    Indication: Skin and Soft Tissue Infection    Current estimated CrCl = Estimated Creatinine Clearance: 123.5 mL/min (based on Cr of 0.62).    Creatinine for last 3 days  8/15/2017: 10:16 PM Creatinine 0.62 mg/dL    Recent Vancomycin Level(s) for last 3 days  No results found for requested labs within last 72 hours.      Vancomycin IV Administrations (past 72 hours)      No vancomycin orders with administrations in past 72 hours.                Nephrotoxins and other renal medications     None          Contrast Orders - past 72 hours     None                Plan:  1.  Start vancomycin  1750 mg IV q12h.   2.  Goal Trough Level: 10-15 mg/L (Dosed it a bit more aggressively - targeted 17 mg/L).  3.  Pharmacy will check trough levels as appropriate in 1-3 Days.    4. Serum creatinine levels will be ordered daily for the first week of therapy and at least twice weekly for subsequent weeks.    5. [place] method utilized to dose vancomycin therapy: Method 2    Roshan Kong

## 2017-08-16 NOTE — PROGRESS NOTES
[facility], [place]    Internal Medicine Progress Note - [facility] Service    Main Plans for Today   -dermatology consult  -orthopedics consult  -x-ray of the right knee      Assessment & Plan   Alissa Escoto is a 57 year old female with a history of an allergy to betadine, who had a TKA in beginning of July after which she had recurrent formation of tense bullae in the area on the arthroplasty, found to have MSSA in the blister fluid and completed almost a full course of clindamycin, admitted for management of recurrent blisters.       Tense bullous blisters- these blisters only started appearing after her TKA in the beginning of July, and are in the distribution of the wrap that she had on her knee as a prep for the TKA. Suspect allergic reaction vs infection. Will have dermatology consult and orthopedics consult. Less likely joint infection, but given proximity to scar would like evaluation from surgery.   - f/u wound culture results  - continue IV vancomycin  - dermatology consult   - orthopedic surgery consult   - home tramadol PRN for pain  - Tylenol PRN pain  - benadryl PRN for itching      Unilateral leg swelling- ultrasound venous doppler done in the ED shows no evidence of DVT. This leg swelling could be due to inflammatory processes affecting venous flow.  - continue to monitor  - DVT ppx with lovenox.      Hypokalemia  Potassium 3.1 on admission. replete     Bronchiectasis- has a diagnosis of bronchiectasis, and has a PRN albuterol. This has been stable without symptoms.   - continue to monitor, reassess if symptomatic.   - home albuterol PRN      Hypothyroidism- on home levothyroxine  - continue home medication     Hyperlipidemia- is on a statin at home which she takes in PM. Unsure of dose, and it's not documented here. Pt will recheck in the morning and let us know.   - reorder atorvastatin when dosing is known.      CODE: Full  DVT: Lovenox  FEN: Regular diet  Disposition:  pending medical management    Diet: Combination Diet Regular Diet Adult  Room Service  Fluids: none  DVT Prophylaxis: Enoxaparin (Lovenox) SQ  Code Status: full    Disposition Plan   Expected discharge: Tomorrow, recommended to prior living arrangement once lesions are evaluated and plan for treatment established.      Entered: Arnaldo Martinez 08/16/2017, 6:39 PM   Information in the above section will display in the discharge planner report.      The patient's care was discussed with the Attending Physician, Dr. davidson.    Arnaldo Martinez MD  PGY2 [facility]: 5  Pager: [phone]  Please see sticky note for cross cover information    Interval History   No fever of chills  She has pain from the blisters, there has been minimal change   Limited range of motion in the right knee   Denies sob, cp, no abd pain, no diarrhea     Physical Exam   Vital Signs: Temp: 96.9  F (36.1  C) Temp src: Oral BP: 117/71 Pulse: 76 Heart Rate: 79 Resp: 18 SpO2: 93 % O2 Device: None (Room air)    Weight: 229 lbs 1.6 oz  General Appearance: NAD  Respiratory: clear  Cardiovascular: r/r/r, no murmurs, rubs or gallops  GI: soft, nontender, bowel sounds present   Skin: bullous lesions on the right knee some with clear fluid,others appear cloudy fluid, some crusting       Data   Data     Recent Labs  Lab 08/16/17  0701 08/15/17  2216   WBC 5.3 5.6   HGB 11.7 12.1   MCV 94 95    286    143   POTASSIUM 4.0 3.1*   CHLORIDE 108 114*   CO2 28 23   BUN 14 13   CR 0.71 0.62   ANIONGAP 7 6   DEEDEE 9.4 7.3*   * 72

## 2017-08-16 NOTE — PHARMACY-ADMISSION MEDICATION HISTORY
Admission medication history interview status for the 8/15/2017 admission is complete. See Epic admission navigator for allergy information, pharmacy, prior to admission medications and immunization status.     Medication history interview sources:  Patient    Changes made to PTA medication list (reason)  Added: Clonazepam  Deleted: None  Changed: Added strengths to simvastatin and levothyroxine    Additional medication history information (including reliability of information, actions taken by pharmacist):     1. Clindamycin is a 10 day course planned to end on 8/17/17.   2. Clonazepam is a new medication started 1-2 weeks ago.       Prior to Admission medications    Medication Sig Last Dose Taking? Auth Provider   TRAMADOL HCL PO Take  mg by mouth 4 times daily as needed  Past Week at prn Yes Reported, Patient   CLONAZEPAM PO Take 0.5-1 mg by mouth 2 times daily as needed for anxiety Past Week at prn Yes Unknown, Entered By History   CLINDAMYCIN HCL PO Take 300 mg by mouth 3 times daily 8/15/2017 at 1500 Yes Reported, Patient   LEVOTHYROXINE SODIUM PO Take 88 mcg by mouth daily 8/15/2017 at 700 Yes Reported, Patient   SIMVASTATIN PO Take 40 mg by mouth At Bedtime  8/15/2017 at 2100 Yes Reported, Patient         Medication history completed by: Sherly Gilliland, PharmD Student

## 2017-08-17 LAB
ANION GAP SERPL CALCULATED.3IONS-SCNC: 9 MMOL/L (ref 3–14)
BUN SERPL-MCNC: 10 MG/DL (ref 7–30)
CALCIUM SERPL-MCNC: 9.1 MG/DL (ref 8.5–10.1)
CHLORIDE SERPL-SCNC: 107 MMOL/L (ref 94–109)
CO2 SERPL-SCNC: 25 MMOL/L (ref 20–32)
CREAT SERPL-MCNC: 0.64 MG/DL (ref 0.52–1.04)
ERYTHROCYTE [DISTWIDTH] IN BLOOD BY AUTOMATED COUNT: 13 % (ref 10–15)
GFR SERPL CREATININE-BSD FRML MDRD: >90 ML/MIN/1.7M2
GLUCOSE SERPL-MCNC: 126 MG/DL (ref 70–99)
HCT VFR BLD AUTO: 37.1 % (ref 35–47)
HGB BLD-MCNC: 12.1 G/DL (ref 11.7–15.7)
MCH RBC QN AUTO: 30.6 PG (ref 26.5–33)
MCHC RBC AUTO-ENTMCNC: 32.6 G/DL (ref 31.5–36.5)
MCV RBC AUTO: 94 FL (ref 78–100)
PLATELET # BLD AUTO: 289 10E9/L (ref 150–450)
POTASSIUM SERPL-SCNC: 3.8 MMOL/L (ref 3.4–5.3)
RBC # BLD AUTO: 3.96 10E12/L (ref 3.8–5.2)
SODIUM SERPL-SCNC: 141 MMOL/L (ref 133–144)
WBC # BLD AUTO: 6.3 10E9/L (ref 4–11)

## 2017-08-17 PROCEDURE — 25000128 H RX IP 250 OP 636: Performed by: STUDENT IN AN ORGANIZED HEALTH CARE EDUCATION/TRAINING PROGRAM

## 2017-08-17 PROCEDURE — 25000132 ZZH RX MED GY IP 250 OP 250 PS 637: Performed by: STUDENT IN AN ORGANIZED HEALTH CARE EDUCATION/TRAINING PROGRAM

## 2017-08-17 PROCEDURE — 99233 SBSQ HOSP IP/OBS HIGH 50: CPT | Mod: GC | Performed by: INTERNAL MEDICINE

## 2017-08-17 PROCEDURE — 25000125 ZZHC RX 250: Performed by: STUDENT IN AN ORGANIZED HEALTH CARE EDUCATION/TRAINING PROGRAM

## 2017-08-17 PROCEDURE — 94640 AIRWAY INHALATION TREATMENT: CPT

## 2017-08-17 PROCEDURE — 40000275 ZZH STATISTIC RCP TIME EA 10 MIN

## 2017-08-17 PROCEDURE — 85027 COMPLETE CBC AUTOMATED: CPT | Performed by: STUDENT IN AN ORGANIZED HEALTH CARE EDUCATION/TRAINING PROGRAM

## 2017-08-17 PROCEDURE — 80048 BASIC METABOLIC PNL TOTAL CA: CPT | Performed by: STUDENT IN AN ORGANIZED HEALTH CARE EDUCATION/TRAINING PROGRAM

## 2017-08-17 PROCEDURE — 25000128 H RX IP 250 OP 636: Performed by: EMERGENCY MEDICINE

## 2017-08-17 PROCEDURE — 40000894 ZZH STATISTIC OT IP EVAL DEFER

## 2017-08-17 PROCEDURE — 36415 COLL VENOUS BLD VENIPUNCTURE: CPT | Performed by: STUDENT IN AN ORGANIZED HEALTH CARE EDUCATION/TRAINING PROGRAM

## 2017-08-17 PROCEDURE — 12000001 ZZH R&B MED SURG/OB UMMC

## 2017-08-17 RX ORDER — MUPIROCIN 20 MG/G
OINTMENT TOPICAL 3 TIMES DAILY
Status: DISCONTINUED | OUTPATIENT
Start: 2017-08-17 | End: 2017-08-18 | Stop reason: HOSPADM

## 2017-08-17 RX ORDER — ONDANSETRON 2 MG/ML
4 INJECTION INTRAMUSCULAR; INTRAVENOUS ONCE
Status: COMPLETED | OUTPATIENT
Start: 2017-08-17 | End: 2017-08-17

## 2017-08-17 RX ADMIN — ACETAMINOPHEN, ASPIRIN AND CAFFEINE 1 TABLET: 250; 250; 65 TABLET, FILM COATED ORAL at 22:58

## 2017-08-17 RX ADMIN — LEVOTHYROXINE SODIUM 88 MCG: 88 TABLET ORAL at 09:18

## 2017-08-17 RX ADMIN — VANCOMYCIN HYDROCHLORIDE 1750 MG: 10 INJECTION, POWDER, LYOPHILIZED, FOR SOLUTION INTRAVENOUS at 01:28

## 2017-08-17 RX ADMIN — TRIAMCINOLONE ACETONIDE: 1 OINTMENT TOPICAL at 22:23

## 2017-08-17 RX ADMIN — Medication: at 22:16

## 2017-08-17 RX ADMIN — ONDANSETRON 4 MG: 2 INJECTION INTRAMUSCULAR; INTRAVENOUS at 09:19

## 2017-08-17 RX ADMIN — ACETAMINOPHEN, ASPIRIN AND CAFFEINE 1 TABLET: 250; 250; 65 TABLET, FILM COATED ORAL at 09:18

## 2017-08-17 RX ADMIN — Medication: at 09:20

## 2017-08-17 RX ADMIN — WHITE PETROLATUM: 1.75 OINTMENT TOPICAL at 09:20

## 2017-08-17 RX ADMIN — ENOXAPARIN SODIUM 40 MG: 40 INJECTION SUBCUTANEOUS at 09:19

## 2017-08-17 RX ADMIN — ALBUTEROL SULFATE 2.5 MG: 2.5 SOLUTION RESPIRATORY (INHALATION) at 01:15

## 2017-08-17 RX ADMIN — TRIAMCINOLONE ACETONIDE: 1 OINTMENT TOPICAL at 09:19

## 2017-08-17 ASSESSMENT — ENCOUNTER SYMPTOMS
ARTHRALGIAS: 0
MYALGIAS: 1
COLOR CHANGE: 1

## 2017-08-17 NOTE — PLAN OF CARE
"Problem: Goal Outcome Summary  Goal: Goal Outcome Summary  Patient up to bathroom independently. Soak done on knee and outlined her rash to watch for spreading. Area became more rashy/red like after the soak done around the blisters. Patient slept in this am feeling \"off\" and after receiving zofran and excedrin and sleeping feel somewhat better. Patient emotional at times and frustrated due to the situation. P;cont to monitor      "

## 2017-08-17 NOTE — PLAN OF CARE
Problem: Goal Outcome Summary  Goal: Goal Outcome Summary  OT/5B - OT orders received, however, OT deferred. Per discussion with patient, PT, and chart review, no acute OT needs at this time. PT will follow as appropriate to progress knee. Pt up independently to bathroom and expresses no ADL concerns. Will complete OT orders. Please re-consult if new needs arise.

## 2017-08-17 NOTE — PLAN OF CARE
Problem: Goal Outcome Summary  Goal: Goal Outcome Summary  PT: Pt supine, not feeling well and had self reported emesis this am. Pt politely declined working with PT this am, may be up for it in pm, will check back as schedule allows. Pt has been seeing OP PT 2x/week for TKA but ROM has decreased some with new developments in knee, may be appropriate for a couple sessions a week while admitted to progress knee as able.

## 2017-08-17 NOTE — PROGRESS NOTES
[facility], [place]    Internal Medicine Progress Note - [facility] Service    Main Plans for Today   -pt evaluation   -excedrin for headache     Assessment & Plan   Alissa Escoto is a 57 year old female with a history of an allergy to betadine, who had a TKA in beginning of July after which she had recurrent formation of tense bullae in the area on the arthroplasty, found to have MSSA in the blister fluid and completed almost a full course of clindamycin, admitted for management of recurrent blisters.       Contact Dermatitis  Bullous Impetigo   these blisters only started appearing after her TKA in the beginning of July, and are in the distribution of the wrap that she had on her knee as a prep for the TKA. Suspect allergic reaction vs infection. Seen by dermatology who diagnosed patient with impetigo superimposed on contact dermatitis. Discontinued vancomycin. Orthopedics not concerned about joint infection.   -f/u wound culture results  -dankin solution BID  -triamcinolone cream     Headache-migrainous type. With nausea and light sensitivity. Patient reports this has occurred before. Ordered PRN Excedrin which patient takes at home     Unilateral leg swelling- ultrasound venous doppler done in the ED shows no evidence of DVT. This leg swelling could be due to inflammatory processes affecting venous flow.  - continue to monitor  - DVT ppx with lovenox.      Hypokalemia  Potassium 3.1 on admission. repleted     Bronchiectasis- has a diagnosis of bronchiectasis, and has a PRN albuterol. This has been stable without symptoms.   - continue to monitor, reassess if symptomatic.   - home albuterol PRN      Hypothyroidism- on home levothyroxine  - continue home medication     Hyperlipidemia- is on a statin at home which she takes in PM. Unsure of dose, and it's not documented here. Pt will recheck in the morning and let us know.   - reorder atorvastatin when dosing is known.      CODE: Full  DVT:  Lovenox  FEN: Regular diet  Disposition: pending medical management    Diet: Combination Diet Regular Diet Adult  Room Service  Fluids: none  DVT Prophylaxis: Enoxaparin (Lovenox) SQ  Code Status: full    Disposition Plan   Expected discharge: Tomorrow, recommended to prior living arrangement once lesions are evaluated and plan for treatment established.      Entered: Arnaldo Martinez 08/17/2017, 6:25 PM   Information in the above section will display in the discharge planner report.      The patient's care was discussed with the Attending Physician, Dr. davidson.    Arnaldo Martinez MD  PGY2 [facility]  Pager: [phone]  Please see sticky note for cross cover information    Interval History   Reported nausea overnight with headache  No chest pain, felt some difficulty breathing and received an nebulizer  Reports feeling subjective fever  No chest pain    Constituitional: subjective fever  HEENT: headache  RES: no cough, some sob  CV: no chest pain  GI: nausea  Ext; no change in skin lesions      Physical Exam   Vital Signs: Temp: 97.2  F (36.2  C) Temp src: Oral BP: 119/66 Pulse: 83 Heart Rate: 93 Resp: 16 SpO2: 95 % O2 Device: None (Room air)    Weight: 233 lbs 11 oz  General Appearance: Uncomfortable secondary to headache   Respiratory: clear  Cardiovascular: r/r/r, no murmurs, rubs or gallops  GI: soft, nontender, bowel sounds present   Skin: bullous lesions on the right knee some with clear fluid,others appear cloudy fluid, some crusting       Data   Data     Recent Labs  Lab 08/17/17  0548 08/16/17  0701 08/15/17  2216   WBC 6.3 5.3 5.6   HGB 12.1 11.7 12.1   MCV 94 94 95    280 286    142 143   POTASSIUM 3.8 4.0 3.1*   CHLORIDE 107 108 114*   CO2 25 28 23   BUN 10 14 13   CR 0.64 0.71 0.62   ANIONGAP 9 7 6   DEEDEE 9.1 9.4 7.3*   * 101* 72

## 2017-08-17 NOTE — PROVIDER NOTIFICATION
"CC paged: Pt c/o alternating cold/hot flashes with sweating, headache, nausea and vomiting, and SOB. SOB subsided after neb use. Stated that she normally takes \"5 mg of ultram\" but does not know if current dose is the cause. VSS.  "

## 2017-08-17 NOTE — PLAN OF CARE
Problem: Goal Outcome Summary  Goal: Goal Outcome Summary  VSS with exception of slightly elevated diastolic BP. Awake intermittently throughout the night with c/o of alternating heat/cold flashes, headache, nausea and vomiting (emesis X1), and sweating; read previous notes for more details. Refused ibuprofen for mild pain and h/a. Sea band, fan and aroma therapy provided with some nausea relief. One time dose of zofran available to give, pt currently sleeping. Scheduled vancomycin administered, PIV set tko. Blister cultures pending, no further changes to right knee s/s  R: Continue to monitor

## 2017-08-17 NOTE — PROGRESS NOTES
Care Coordinator Progress Note     Admission Date/Time:  8/15/2017  Attending MD:  Minh Jeter MD     Data  Chart reviewed, discussed with interdisciplinary team.   Patient was admitted for:    Cellulitis of right lower extremity  Vesicular eruption of skin  Presence of right artificial knee joint.    Coordination of Care and Referrals: Provided patient/family with options for nothing at this time.        Assessment  Patient had a TKA in beginning of July after which she had recurrent formation of tense bullae in the area on the arthroplasty, found to have MSSA in the blister fluid and completed almost a full course of clindamycin, admitted for management of recurrent blisters.  Chart reviewed.  Patient has been going to OP PT with [facility] in [place].  Resumption order placed.  Per nursing notes, patient has been getting up independently with her cane.  PT to evaluate and give recommendations for discharge.     Plan  Anticipated Discharge Date:  Today or tomorrow  Anticipated Discharge Plan:  Home with OP PT    Ariana Henning RN, BSN  Care Coordinator [facility] [place] [facility]  Pager: [phone]  Phone: [phone]

## 2017-08-17 NOTE — PROGRESS NOTES
"Patient feels \"off\". Patient complains of being hot. Given caff/excedrin and zofran administered patient wanted to sleep more and will let this nurse know when she is up to do her wound care.   "

## 2017-08-17 NOTE — PROVIDER NOTIFICATION
CC paged with update: Pt continues with nausea, but no vomiting. No c/o SOB or heat/cold flashes. VSS except slightly elevated diastolic; 125/92. Continues with headache.

## 2017-08-18 VITALS
TEMPERATURE: 97 F | BODY MASS INDEX: 37.56 KG/M2 | HEART RATE: 86 BPM | HEIGHT: 66 IN | DIASTOLIC BLOOD PRESSURE: 67 MMHG | RESPIRATION RATE: 16 BRPM | WEIGHT: 233.69 LBS | SYSTOLIC BLOOD PRESSURE: 114 MMHG | OXYGEN SATURATION: 98 %

## 2017-08-18 LAB
BACTERIA SPEC CULT: NO GROWTH
BACTERIA SPEC CULT: NO GROWTH
BACTERIA SPEC CULT: NORMAL
CREAT SERPL-MCNC: 0.74 MG/DL (ref 0.52–1.04)
GFR SERPL CREATININE-BSD FRML MDRD: 80 ML/MIN/1.7M2
Lab: NORMAL
SPECIMEN SOURCE: NORMAL

## 2017-08-18 PROCEDURE — 40000893 ZZH STATISTIC PT IP EVAL DEFER: Performed by: PHYSICAL THERAPIST

## 2017-08-18 PROCEDURE — 36415 COLL VENOUS BLD VENIPUNCTURE: CPT | Performed by: EMERGENCY MEDICINE

## 2017-08-18 PROCEDURE — 25000125 ZZHC RX 250: Performed by: STUDENT IN AN ORGANIZED HEALTH CARE EDUCATION/TRAINING PROGRAM

## 2017-08-18 PROCEDURE — 25000132 ZZH RX MED GY IP 250 OP 250 PS 637: Performed by: STUDENT IN AN ORGANIZED HEALTH CARE EDUCATION/TRAINING PROGRAM

## 2017-08-18 PROCEDURE — 99238 HOSP IP/OBS DSCHRG MGMT 30/<: CPT | Mod: GC | Performed by: INTERNAL MEDICINE

## 2017-08-18 PROCEDURE — 82565 ASSAY OF CREATININE: CPT | Performed by: EMERGENCY MEDICINE

## 2017-08-18 PROCEDURE — 25000128 H RX IP 250 OP 636: Performed by: STUDENT IN AN ORGANIZED HEALTH CARE EDUCATION/TRAINING PROGRAM

## 2017-08-18 RX ORDER — MUPIROCIN 20 MG/G
OINTMENT TOPICAL
Qty: 15 G | Refills: 0 | Status: SHIPPED | OUTPATIENT
Start: 2017-08-18 | End: 2023-05-12

## 2017-08-18 RX ORDER — TRIAMCINOLONE ACETONIDE 1 MG/G
OINTMENT TOPICAL
Qty: 15 G | Refills: 0 | Status: SHIPPED | OUTPATIENT
Start: 2017-08-18 | End: 2023-05-12

## 2017-08-18 RX ORDER — MINERAL OIL/HYDROPHIL PETROLAT
OINTMENT (GRAM) TOPICAL
Qty: 50 G | Refills: 0 | Status: SHIPPED | OUTPATIENT
Start: 2017-08-18 | End: 2023-05-12

## 2017-08-18 RX ADMIN — WHITE PETROLATUM: 1.75 OINTMENT TOPICAL at 08:03

## 2017-08-18 RX ADMIN — MUPIROCIN: 20 OINTMENT TOPICAL at 08:03

## 2017-08-18 RX ADMIN — Medication: at 08:05

## 2017-08-18 RX ADMIN — TRIAMCINOLONE ACETONIDE: 1 OINTMENT TOPICAL at 08:02

## 2017-08-18 RX ADMIN — LEVOTHYROXINE SODIUM 88 MCG: 88 TABLET ORAL at 07:58

## 2017-08-18 RX ADMIN — ENOXAPARIN SODIUM 40 MG: 40 INJECTION SUBCUTANEOUS at 07:58

## 2017-08-18 NOTE — PLAN OF CARE
Problem: Goal Outcome Summary  Goal: Goal Outcome Summary  Outcome: Adequate for Discharge Date Met:  08/18/17  Pt d/c to home. PIV removed. Appropriate d/c paperwork went over. Belongings packed by patient. Patient left floor with  approx 1530.

## 2017-08-18 NOTE — DISCHARGE SUMMARY
[facility], [place]    Internal Medicine Discharge Summary- [facility] Service    Date of Admission:  8/15/2017  Date of Discharge:  8/18/2017  Discharging Attending Provider: Dr. Jeter   Discharge Team: [facility] 5    Discharge Diagnoses   Contact Dermatitis     Bullous Impetigo     Migraine     Follow-ups Needed After Discharge   1) Follow up with PCP in 1 week  2) Continue outpatient physical therapy, follow up with outpatient orthopedics doctor as scheduled   3) Apply soaked gauze in Dankin's solution two times daily until the blisters resolve  4) Apply triamcinolone cream to the lesions on the right knee two times daily until the lesions resolve   5) Apply mupirocin on the skin lesion three times day until the lesions resolve.     Hospital Course   Alissa Escoto is a 57 year old female with a PMH significnt for right total knee replacement, bronchiectasis, HLD, who presents with bullous lesions in proximity of her right knee which appeared following surgery, suspicion for allergic reaction versus infection.     The following problems were addressed during her hospitalization:    Contact Dermatitis  Bullous Impetigo  Patient noted blister formation on her right knee following R TKA on 7/5/2017. She reports that she had betadine used a prep during surgery and noted irritation around her right knee following surgery. Then she developed blisters on her right leg with drainage.  She was seen by a physician last week, and was started on PO clindamycin for suspected infection. Here she was started on IV vancomycin. Dermatology was consulted and diagnosed patient with contact dermatitis with superimposed bullous impetigo. Orthopedics also saw the patient and were not concerned about a joint infection. Vancomycin was discontinued, she was started on topical therapy with dankin solution, triamcinolone ointment and mupirocin cream.     Headache-  Patient developed a migraine headache while admitted.  Treated with excedrin, and the headache resolved     Unilateral leg swelling- Presented with asymmetric lower extremities. Venous doppler ultrasound of the right lower extremity didn't show evidence of DVT.       Consultations This Hospital Stay   PHARMACY TO DOSE VANCO  DERMATOLOGY IP CONSULT  PHARMACY TO DOSE VANCO  PHYSICAL THERAPY ADULT IP CONSULT  ORTHOPEDICS IP CONSULT  MEDICATION HISTORY IP PHARMACY CONSULT  PHYSICAL THERAPY ADULT IP CONSULT  OCCUPATIONAL THERAPY ADULT IP CONSULT     Code Status   Full Code       The patient was discussed with Dr. Steffany Martinez MD  PGY2 IM  ______________________________________________________________________    Physical Exam   Vital Signs: Temp: 97  F (36.1  C) Temp src: Oral BP: 114/67 Pulse: 86 Heart Rate: 75 Resp: 16 SpO2: 98 % O2 Device: None (Room air)    Weight: 233 lbs 11 oz    General Appearance: NAD  Respiratory: clear, no w/r/r  Cardiovascular: r/r/r, no murmurs rubs or gallops   GI: soft, nontender, nondistended, bowel sounds appreciated   Skin: there is a bullous lesion below the patient's right knee with cloudy fluid inside, surrounding the knee there are also associated crusted lesions     Significant Results and Procedures   Results for orders placed or performed during the hospital encounter of 08/15/17   US Lower Extremity Venous Duplex Right    Narrative    EXAMINATION: DOPPLER VENOUS ULTRASOUND OF THE RIGHT LOWER EXTREMITY,  8/16/2017 1:29 AM     COMPARISON: None.    HISTORY: ? DVT    TECHNIQUE:  Gray-scale evaluation with compression, spectral flow, and  color Doppler assessment of the deep venous system of the right leg  from groin to knee, and then at the ankle.    FINDINGS:  In the right lower extremity, the common femoral, femoral, popliteal  and posterior tibial veins demonstrate normal compressibility and  blood flow.      Impression    IMPRESSION:  1.  No evidence of right lower extremity deep venous thrombosis.    I have personally  reviewed the examination and initial interpretation  and I agree with the findings.    MAXX CARL MD   XR Knee Right 1/2 Views    Narrative    Exam: 2 views of the right knee dated 8/16/2017.    COMPARISON: None.    CLINICAL HISTORY: Right knee arthroplasty.    FINDINGS: AP and lateral views of the right knee were obtained.  Postsurgical changes of a right total knee arthroplasty. The hardware  appears intact. Small right knee joint effusion.      Impression    IMPRESSION: Postsurgical changes of a right total knee arthroplasty,  without complication.    RUFINO MONTESINOS MD       Pending Results     Unresulted Labs Ordered in the Past 30 Days of this Admission     Date and Time Order Name Status Description    8/16/2017 1356 Skin Culture Aerobic Bacterial Preliminary     8/16/2017 0050 Wound Culture Aerobic Bacterial Preliminary              Primary Care Physician   INGRIS WALTERS    Discharge Disposition   Discharged to home  Condition at discharge: Stable    Discharge Orders     Physical Therapy Referral       Discharge Medications   Current Discharge Medication List      CONTINUE these medications which have NOT CHANGED    Details   TRAMADOL HCL PO Take  mg by mouth 4 times daily as needed       CLONAZEPAM PO Take 0.5-1 mg by mouth 2 times daily as needed for anxiety      CLINDAMYCIN HCL PO Take 300 mg by mouth 3 times daily      LEVOTHYROXINE SODIUM PO Take 88 mcg by mouth daily      SIMVASTATIN PO Take 40 mg by mouth At Bedtime            Allergies   Allergies   Allergen Reactions     Sulfa Drugs Anaphylaxis     Betadine [Povidone Iodine] Rash     Blisters     Iodine Rash

## 2017-08-18 NOTE — PLAN OF CARE
Problem: Goal Outcome Summary  Goal: Goal Outcome Summary  PT-5B: eval & treat orders received. Met with patient to discuss needs & POC; she is discharging to home later this date & [facility] [place]. She demonstrates good technique with stretches to improve knee flex & ext. No skilled inpatient needs at this time. PT order completed.

## 2017-08-18 NOTE — PLAN OF CARE
Problem: Goal Outcome Summary  Goal: Goal Outcome Summary  Outcome: No Change  AAO. VSS on ra. Denies pain or headache. Up ad zarina with cane. Right knee CDI and MIL. PIV SL. Slept through the night.  R: Continue to monitor and implement POC

## 2017-08-18 NOTE — PLAN OF CARE
Problem: Goal Outcome Summary  Goal: Goal Outcome Summary  Outcome: No Change  Patient in good spirits. Ate fair for supper. No complaint of pain. Incision of right knee is C/D/I and rash/infection treated as ordered at bedtime and creams applied. Excedrin given per patient request to prevent a headache overnight. She is up ad zarina.

## 2017-08-21 ENCOUNTER — CARE COORDINATION (OUTPATIENT)
Dept: CARE COORDINATION | Facility: CLINIC | Age: 57
End: 2017-08-21

## 2019-09-29 ENCOUNTER — HEALTH MAINTENANCE LETTER (OUTPATIENT)
Age: 59
End: 2019-09-29

## 2021-01-14 ENCOUNTER — HEALTH MAINTENANCE LETTER (OUTPATIENT)
Age: 61
End: 2021-01-14

## 2021-10-23 ENCOUNTER — HEALTH MAINTENANCE LETTER (OUTPATIENT)
Age: 61
End: 2021-10-23

## 2022-02-12 ENCOUNTER — HEALTH MAINTENANCE LETTER (OUTPATIENT)
Age: 62
End: 2022-02-12

## 2022-10-10 ENCOUNTER — HEALTH MAINTENANCE LETTER (OUTPATIENT)
Age: 62
End: 2022-10-10

## 2023-02-06 ENCOUNTER — TRANSCRIBE ORDERS (OUTPATIENT)
Dept: OTHER | Age: 63
End: 2023-02-06
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
## 2023-02-06 DIAGNOSIS — M25.561 ACUTE PAIN OF BOTH KNEES: Primary | ICD-10-CM

## 2023-02-06 DIAGNOSIS — M25.562 ACUTE PAIN OF BOTH KNEES: Primary | ICD-10-CM

## 2023-02-06 DIAGNOSIS — Z47.89 AFTERCARE FOLLOWING SURGERY OF THE MUSCULOSKELETAL SYSTEM: ICD-10-CM

## 2023-02-06 DIAGNOSIS — M25.561 CHRONIC PAIN OF RIGHT KNEE: ICD-10-CM

## 2023-02-06 DIAGNOSIS — G89.29 CHRONIC PAIN OF RIGHT KNEE: ICD-10-CM

## 2023-03-14 ENCOUNTER — TRANSCRIBE ORDERS (OUTPATIENT)
Dept: OTHER | Age: 63
End: 2023-03-14

## 2023-03-14 DIAGNOSIS — G89.29 CHRONIC PAIN OF RIGHT KNEE: ICD-10-CM

## 2023-03-14 DIAGNOSIS — Z96.651 S/P TOTAL KNEE ARTHROPLASTY, RIGHT: ICD-10-CM
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
## 2023-03-14 DIAGNOSIS — M25.561 CHRONIC PAIN OF RIGHT KNEE: ICD-10-CM

## 2023-03-14 DIAGNOSIS — M25.561 ACUTE PAIN OF BOTH KNEES: Primary | ICD-10-CM

## 2023-03-14 DIAGNOSIS — M25.562 ACUTE PAIN OF BOTH KNEES: Primary | ICD-10-CM

## 2023-03-15 NOTE — TELEPHONE ENCOUNTER
SPINE PATIENTS - NEW PROTOCOL PREVISIT    RECORDS RECEIVED FROM: Roly   REASON FOR VISIT: Further evaluation and consideration of RFA   Date of Appt: 05/12/2023   NOTES (FOR ALL VISITS) STATUS DETAILS   OFFICE NOTE from referring provider Care Everywhere 01/30/2023 [facility]    OFFICE NOTE from other specialist N/A    DISCHARGE SUMMARY from hospital N/A    DISCHARGE REPORT from ER N/A    OPERATIVE REPORT Care Everywhere 06/30/2021 INPATIENT RIGHT KNEE OPEN DEBRIDEMENT AND POLY EXCHANGE  04/29/2020 right knee manipulation under anesthesia, synovectomy, and lysis of adhesions   07/31/2019 RIGHT KNEE TWO COMPONENT TOTAL ARTHROPLASTY REVISION  07/05/2017 RIGHT TOTAL ARTHROPLASTY KNEE    05/16/2016 CONVERSION OF LEFT UNI TO TOTAL KNEE ARTHROPLASTY  07/15/2015 MANIPULATION JOINT KNEE LFT  05/11/2015 MANIPULATION JOINT KNEE LFT   EMG REPORT N/A    MEDICATION LIST N/A    IMAGING  (FOR ALL VISITS)     MRI (HEAD, NECK, SPINE) Received 12/08/2022 RT knee cervical spine   XRAY (SPINE) *NEUROSURGERY* N/A    CT (HEAD, NECK, SPINE) N/A       Images in PACS

## 2023-03-25 ENCOUNTER — HEALTH MAINTENANCE LETTER (OUTPATIENT)
Age: 63
End: 2023-03-25

## 2023-05-12 ENCOUNTER — OFFICE VISIT (OUTPATIENT)
Dept: NEUROSURGERY | Facility: CLINIC | Age: 63
End: 2023-05-12
Attending: FAMILY MEDICINE
Payer: COMMERCIAL

## 2023-05-12 ENCOUNTER — PRE VISIT (OUTPATIENT)
Dept: NEUROSURGERY | Facility: CLINIC | Age: 63
End: 2023-05-12

## 2023-05-12 ENCOUNTER — HOSPITAL ENCOUNTER (OUTPATIENT)
Facility: AMBULATORY SURGERY CENTER | Age: 63
End: 2023-05-12
Attending: PHYSICAL MEDICINE & REHABILITATION
Payer: COMMERCIAL

## 2023-05-12 VITALS
SYSTOLIC BLOOD PRESSURE: 146 MMHG | WEIGHT: 262.5 LBS | HEART RATE: 68 BPM | DIASTOLIC BLOOD PRESSURE: 99 MMHG | OXYGEN SATURATION: 94 % | HEIGHT: 68 IN | BODY MASS INDEX: 39.78 KG/M2

## 2023-05-12 DIAGNOSIS — G89.29 CHRONIC PAIN OF RIGHT KNEE: Primary | ICD-10-CM

## 2023-05-12 DIAGNOSIS — M25.562 ACUTE PAIN OF BOTH KNEES: ICD-10-CM

## 2023-05-12 DIAGNOSIS — Z96.651 S/P TOTAL KNEE ARTHROPLASTY, RIGHT: ICD-10-CM
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
## 2023-05-12 DIAGNOSIS — M25.561 ACUTE PAIN OF BOTH KNEES: ICD-10-CM

## 2023-05-12 DIAGNOSIS — M25.561 CHRONIC PAIN OF RIGHT KNEE: Primary | ICD-10-CM

## 2023-05-12 DIAGNOSIS — M25.561 CHRONIC PAIN OF RIGHT KNEE: ICD-10-CM

## 2023-05-12 DIAGNOSIS — G89.29 CHRONIC PAIN OF RIGHT KNEE: ICD-10-CM

## 2023-05-12 PROCEDURE — 99205 OFFICE O/P NEW HI 60 MIN: CPT | Mod: GC | Performed by: PHYSICAL MEDICINE & REHABILITATION

## 2023-05-12 RX ORDER — DULOXETIN HYDROCHLORIDE 30 MG/1
30 CAPSULE, DELAYED RELEASE ORAL 2 TIMES DAILY
Qty: 60 CAPSULE | Refills: 3 | Status: SHIPPED | OUTPATIENT
Start: 2023-05-12

## 2023-05-12 ASSESSMENT — PAIN SCALES - GENERAL: PAINLEVEL: EXTREME PAIN (8)

## 2023-05-12 NOTE — LETTER
5/12/2023         RE: Alissa Escoto  [address] [facility] [address]        Dear Colleague,    Thank you for referring your patient, Alissa Escoto, to the [facility] [place]. Please see a copy of my visit note below.    Patient seen at the request of Dr. Naif Pope for evaluation of genicular block and radiofrequency ablation     HISTORY OF PRESENT ILLNESS:  Alissa Escoto is a 62 year old female who presents with a chief complaint of right knee pain .     Pain began many years ago and progressed to the point where she underwent right total knee arthroplasty in 2017. She has since underwent approximately 5 surgical interventions on her right knee, most recently on 5/12/2022 with Dr. Pope. She has never had adequate relief following her knee replacement. She did have a left TKA in 2016 with good relief. A PRP injection in 9/2022 provided moderate relief however began to have increased pain in December 2022 after falling. She received a right pes anserine bursa injection which provided some relief until she fell again in January 2023.     The pain is localized primarily to the infra patellar region (60%) and medial joint line along her scar tissue (40%). She has always had numbness since surgery in the infrapatellar region, no tingling; described as achy/tightness in nature, also feels lots of tightness and intermittent fullness in her posterior knee. Reports never having been able to achieve full ROM in her right knee. Pain is reported as 3/10 at rest today and up to 8/10 at worst in the last week. Symptoms are worse with walking up and down hills especially at work, prolonged sitting in the car; and improved with rest and massage. She does report pain-related sleep disturbance.     Functional limitations: Ability to work due to need for mobility, yard work, can't walk more than 20 minutes at leisurely pace     PRIOR INJURIES/TREATMENT:   Ice/Heat: ice helps,  heat for muscle tightness and helpful   Brace: None recently, wore prior to surgery   Physical Therapy: PT multiple grouped sessions, last was mid 2021 x7 sessions with a focus on knee strengthening and ROM.      - Current Pain Medications -   Nothing    - Prior/Trialed Pain Medications -   Excedrin - stopped due to nose bleeds  Ibuprofen - does not tolerate, not helpful   Tylenol - does not tolerate, not helpful   Salonpas - limited benefit      Prior Procedures:  Date    Procedure     Improvement (%)  12/19/22 USG R Pes ans bursa w/ steroid 30% x 2 weeks   9/29/22 PRP of the right knee scar tissue 50% x 3 months until fall in 12/2022    Prior Related Surgery:  S/p right knee total arthroplasty by Dr. Burnett on 7/5/2017  S/p right revision total knee arthroplasty by Dr. Burnett on 7/31/2019  S/p right knee manipulation, arthrosocpic synovectomy and lysis of adhesions by Dr. Ortiz on 4/29/2020  S/p right knee open debridement and poly exchange by Dr. Burnett on 6/31/2021  S/p secondary repair of the right knee scar tissue of the anterolateral knee joint line/patella tendon by Dr. Pope on 5/12/2022    Other (acupuncture, OMT, CMM, TENS, DME, etc.):   - TENS not helpful  - Chiropractor helpful   - Not interested in acupuncture     Specialists Seen - (with most recent, available notes and clinic visits reviewed)   1. [facility] - Dr. Naif Pope     IMAGING - reviewed   EXAM: MR KNEE RIGHT wo CONTRAST 12/8/2022  FINDINGS:   MEDIAL COMPARTMENT:   -Obscured by artifact.   LATERAL COMPARTMENT:   -Obscured by artifact.   PATELLOFEMORAL COMPARTMENT:   -Patella midline. No subluxation or tilting.   LIGAMENTS:   -Obscured by artifact.   POSTEROMEDIAL CORNER:   -Distal semimembranosus tendon and pes anserine tendons are intact.   POSTEROLATERAL CORNER:   -Popliteal tendon and biceps femoris tendon are intact.   EXTENSOR MECHANISM:   -Quadriceps tendon: Intact.   -Patellar tendon: Intact.    -Patellofemoral ligaments and retinacula: Intact.   JOINT:   -Small effusion.   BONES:   -Right total knee arthroplasty with longstem femoral and tibial   components. No fracture.     SOFT TISSUES:   -No periprosthetic fluid collection. No acute muscular injury or soft   tissue mass.      IMPRESSION:   1.  Postop changes of a right total knee arthroplasty with patellar   resurfacing. No fracture or acute injury.       XR KNEE WB 1 VIEW AP BILATERAL AND 2 VIEWS BILATERAL 1/30/2023   IMPRESSION:   Bilateral total knee replacements. There is no acute periprosthetic fracture or joint effusion on either side. No periprosthetic lucency to suggest hardware loosening.    Review Of Systems:   I am responding to those symptoms which are directly relevant to the specific indication for my consultation. I recommend that the patient follow up with their primary or referring provider to pursue any other symptoms which may be of concern.    Medical History:  Per patient notable for chronic knee pain, cervical DDD, IBS, hypothyroidism, asthma, bronchiectasis     Surgical History:   Bilateral knee TKA, cholecystectomy, hysterectomy, bladder reconstruction ORIF left ankle in 2007    Family History  Her family history is not on file.    Social History:  Work:  with school district   Current living situation: Lives in [place], easily accessible home   She reports that she has never smoked. She does not have any smokeless tobacco history on file. She reports that she does not drink alcohol and does not use drugs.     Current Medications:   She has a current medication list which includes the following prescription(s): clonazepam, levothyroxine sodium, mineral oil-hydrophilic petrolatum, mupirocin, simvastatin, sodium hypochlorite, tramadol hcl, and triamcinolone.     Allergies:    -- Sulfa Antibiotics -- Anaphylaxis   -- Betadine [Povidone Iodine] -- Rash  --  Blisters   -- Iodine -- Rash    PHYSICAL  "EXAMINATION:  Vitals:    05/12/23 1024   BP: (!) 152/100   BP Location: Left arm   Patient Position: Sitting   Cuff Size: Adult Regular   Pulse: 74   SpO2: 94%   Weight: 119.1 kg (262 lb 8 oz)   Height: 1.727 m (5' 8\")     Exam:  BP (!) 146/99 (BP Location: Left arm, Patient Position: Sitting, Cuff Size: Adult Regular)   Pulse 68   Ht 1.727 m (5' 8\")   Wt 119.1 kg (262 lb 8 oz)   SpO2 94%   BMI 39.91 kg/m     General: Pleasant, straightforward, WDWN individual.  Mental Status: Pleasant, direct, appropriate mood and affect  Resp: breathing is unlabored without audible wheeze  Vascular: No visible cyanosis, no venous stasis changes  Heme: No visible ecchymosis or erythema on extremities  Skin: No notable rash     Musculoskeletal exam:  Gait: antalgic on the right   Hip exam: normal internal and external range of motion bilaterally. LAUREL negative. Scour negative.   Knee exam: reduced ROM (lacking 10 deg ext, 50 deg flex), tender diffusely though predominately at medial joint line and infrapatellar region, negative Lachman, Emile, Post. Drawer, Varus/Valgus stress test. Scar noted along anteromedial knee.     Neurologic exam:  Motor Strength:  5/5 symmetric LE strength  Reflexes:  Mute at bilateral patella, 1/4 in achilles   No ankle clonus  Sensory:  (upper and lower extremities):   Light touch: absent in right infrapatellar region, otherwise SILT throughout BLE   Allodynia: absent    Hyperalgesia: absent     ASSESSMENT:  Alissa Escoto is a pleasant 62 year old female who presents with chronic right knee pain.     # Chronic right knee pain. Diffusely located but primarily in the infrapatellar region and medial joint line. Tender to touch in these areas, otherwise objective findings suggestive of further structural instability. Does have limited ROM, present since her initial surgery. Bilateral knee radiographs on 1/30/23 and MRI 12/8/2022 showed stable hardware and no further injury. Has had 5 procedures on " the right knee, including more recently PRP to the surgical scar and USG pes anserine bursa steroid injection, all with limited benefit.   # Right knee arthroplasty with multiple revisions   # Left knee arthroplasty     Complicating co-morbidities include:   # Iodine allergy -when considering interventional procedures  # IBS  # Chronic headaches    PLAN:  - Procedures: Will begin pathway for right genicular nerve radiofrequency ablation.   - Rx: Voltaren gel with application 4 times a day as needed  - Rx: Cymbalta 30 mg daily up to 60mg every day or 30mg BID  - Follow up: Follow up for procedure    Ready to learn, no apparent learning barriers.  Education provided on treatment plan according to patient's preferred learning style.  Patient verbalizes understanding.     Patient was staffed with attending physician, Dr. Rodriguez.   _________________________________  Darci Neville,   PGY-3 Resident   Physical Medicine & Rehabilitation        60 minutes spent by me on the date of the encounter doing chart review, review of outside records, review of test results, patient visit and documentation      I was physically present for the E/M service provided. I agree with the House Officer note and plan, which I have reviewed and edited where appropriate. Alissa Escoto is a pleasant 62 year old female who presents with chronic right knee pain following multiple surgeries including TKA. She has experienced incomplete/inadequate symptomatic relief from multiple pharmacologic trials, repeated courses of physical therapy, interventional pain procedures, and surgeries.    ________________________________   Racquel Rodriguez MD  Department of Physical Medicine & Rehabilitation       Again, thank you for allowing me to participate in the care of your patient.        Sincerely,        Racquel Rodriguez MD

## 2023-05-12 NOTE — NURSING NOTE
"Chief Complaint   Patient presents with     New Patient     New consult for consideration of RFA.  Right knee pain.     She requests these members of her care team be copied on today's visit information:  PCP: Andrei Levi    Referring Provider:  Naif Pope MD  [address] [place] [address]    Vitals:    05/12/23 1024   BP: (!) 152/100   BP Location: Left arm   Patient Position: Sitting   Cuff Size: Adult Regular   Pulse: 74   SpO2: 94%   Weight: 119.1 kg (262 lb 8 oz)   Height: 1.727 m (5' 8\")     Body mass index is 39.91 kg/m .    Medications were reconciled.        Donya Price CMA    "

## 2023-05-12 NOTE — PATIENT INSTRUCTIONS
It was a pleasure meeting you in clinic today.  We discussed the following:    #. Trial of Cymbalta (duloxetine) 30 mg daily. Primary side effect we discussed today is possibility of anxiety or irritability. If you tolerate this medication, this can be increased by primary care to either 30 mg twice a day or 60 mg once daily. Goal of treatment would be approximately 20-30% of pain relief.     #. Trial of topical Voltaren: You can use this up to 4 times a day as needed. Apply to the areas causing you pain.     #.  We discussed undergoing an x-ray guided genicular nerve block and depending on your response to this diagnostic test with local anesthetic, we can consider radiofrequency ablation (RFA).  There is information regarding RFA procedures below.  Please keep in mind that most of the information here is for the spine as we discussed, however, the concept is the same overall.  Please feel free to reach out with any additional questions.      Radiofrequency Ablation (RFA) aka Neurotomy    Preparing for Injection Therapy              Why Do I Need Injection Therapy?  Your Health Care Provider is recommending spine injection therapy to  help relieve your back and neck pain. This will be in addition to other therapies such as medications and physical therapy. The purpose of these injections is to reduce the amount of inflammation (swelling, pain, heat, redness, loss of body function) around the nerves thus reducing the amount of pain.     The medications you will receive with the injections will include:   Anesthetic - medication to numb the painful area.     To reduce your discomfort during the injection procedure, you will receive a numbing medication injection prior to the placement of the needles. You will be lying on your stomach during the injection procedure. We will use a low-dose x-ray (fluoroscopy) to help guide needle placement.  You must have a  for this procedure. We will need to reschedule your  injection if you do not have a  with you.      What are the different types of injections and procedure?  Below, is a brief description of the different types of injections we use to deliver pain medication as close as possible to the nerves in the painful area:    Medial Branch Block injections: This is a test to see if your pain is     coming from a specific nerve. This injection is similar to a facet joint injection but contains only the numbing medication.  You will keep a pain score diary for the rest of the day and the following morning after receiving the injection.    Radiofrequency ablation: This is a procedure that uses radio waves and numbing medication to block the nerves that feel pain at the joint. The pain relief effects can last for a long time, but are not permanent. The procedure is similar to the Medial Branch Block but requires additional testing to ensure that the needle is near the nerve before numbing and ablating it.  Doctors often order sedation for this procedure.  Please see the sections on sedation below.      What Should I Expect if I Receive Sedation? THIS IS ORDERED BY THE PHYSICIAN  This is conscious sedation.  The medications we give to you will help you relax and reduce your anxiety.  You will still be awake for the procedure so we can ask you questions and hear your answers. You will NOT receive sedation for the diagnostic test blocks in order to better help evaluate your response to the injection.     What Are My Responsibilities With Sedation?  You must stop eating and drinking 8 (eight) hours before your procedure. You can have clear fluids (water, coffee/tea without milk) up to 2 hours prior to the injections. Nothing by mouth for 2 hours prior to injection.  This includes gum, mints, or chew.  Take your morning medications with a small sip of water.    You must have a  who will check-in with you, and stay in the building while the procedure is underway.  If you do  not have a  your sedation will be cancelled.  We will monitor you for at least 30 minutes after the procedure before being discharged home.      What Are the Risks and Complications For This Procedure?  Risks and complication are rare, but can still occur.  You should understand, discuss, and accept these risks before agreeing to the procedure. They include, but are not limited to:  infection  nerve damage   paralysis  injection failure or a need for further injections or additional procedures  continued or worsening of symptoms/pain,   medication reaction,  dural leak (into the hole covering around the spinal cord. This may cause a a spinal headache)  leak of the medication into the spinal canal, nerves, or blood vessel.  death       What do I need to do before the procedure?   If you do not follow these instructions your procedure may be cancelled.  Tell us if you are on major blood thinners such as Coumadin, Xarelto , Plavix, Eliquis , Pradaxa , or others.    Contact the doctor who prescribed your blood thinner to ask for permission to stop taking it before you have the injection.    Schedule your pain injection procedure after your doctor gave their permission.   We will notify you when to stop and re-start your blood thinner.  Tell us if you have any allergies to contrast dye.  If you do, we may give additional medications to take before the procedure.  Tell us if you are pregnant, or possibly pregnant.  If so, you cannot receive steroid medications or be exposed to fluoroscopic X-rays.  Tell us if you have been sick during the 10 days before the procedure. This includes:   colds   gastrointestinal illness or discomfort  dental sores,   skin infection, or any other type of infection.  Tell us if you have taken antibiotics during the 10 days prior to the procedure.  Do not drink alcohol the night before or on the day of the procedure.  You must shower the night before and on the day of your  procedure.  Wear comfortable, clean clothing.  If you have an outside MRI (Magnetic Resonance Imaging photo), please bring it with you.    What Will Happen After the Procedure?  If you did not receive sedation we will monitor you for 15 minutes after the procedure. If you received sedation, we will monitor you for at least 30 minutes after the procedure     How soon can I expect pain relief?  You have received 2 types of medications with your injection:    Anesthetic - numbing medication which only acts for a few hours    Steroid which may take 3-14 days to be effective.   You can expect to feel your normal pain after the anesthetic wears off, until the steroid becomes effective.    How should I care for myself at home?  Get plenty of rest and avoid twisting, bending movements, heavy lifting, or strenuous activity for the first 24 hours. This will help the steroid be more effective. Medial Branch Block injections will have different discharge instructions.  Those will be discussed at that injection appointment.  Resume your pain medications  Apply ice packs (on for 20 minutes at a time), every 2-3 hours to your injection area for the first 2-3 days to help with pain control.    Avoid heat (pads or water bottles), which can cause the veins to open up, making the steroid less effective. You can use heat after 48 hours.  Take showers only for the first 48 hours.  No baths, hot tubs, swimming, or soaking for 48 hours to reduce the risk of infection.     When should I call the doctor?  Call us if you have any of the following:   Fever more than 100.5 degrees Fahrenheit   Signs of infection   Severe headache   Severe back pain   Increased numbness or weakness in your legs or arms   Loss of bladder or bowel control  Nausea   Other concerns    What is the contact information?  During business hours Monday-Friday 8a-5p call [phone].   After business hours, on weekends and holidays call [phone] and ask for the  [facility] doctor on call

## 2023-05-12 NOTE — LETTER
5/12/2023      To whom it may concern,     Alissa Escoto was seen in clinic today May 12, 2023 by me Dr. Racquel Rodriguez.      Sincerely,        Racquel Rodriguez MD

## 2023-05-12 NOTE — PROGRESS NOTES
Patient seen at the request of Dr. Naif Pope for evaluation of genicular block and radiofrequency ablation     HISTORY OF PRESENT ILLNESS:  Alissa Escoto is a 62 year old female who presents with a chief complaint of right knee pain .     Pain began many years ago and progressed to the point where she underwent right total knee arthroplasty in 2017. She has since underwent approximately 5 surgical interventions on her right knee, most recently on 5/12/2022 with Dr. Pope. She has never had adequate relief following her knee replacement. She did have a left TKA in 2016 with good relief. A PRP injection in 9/2022 provided moderate relief however began to have increased pain in December 2022 after falling. She received a right pes anserine bursa injection which provided some relief until she fell again in January 2023.     The pain is localized primarily to the infra patellar region (60%) and medial joint line along her scar tissue (40%). She has always had numbness since surgery in the infrapatellar region, no tingling; described as achy/tightness in nature, also feels lots of tightness and intermittent fullness in her posterior knee. Reports never having been able to achieve full ROM in her right knee. Pain is reported as 3/10 at rest today and up to 8/10 at worst in the last week. Symptoms are worse with walking up and down hills especially at work, prolonged sitting in the car; and improved with rest and massage. She does report pain-related sleep disturbance.     Functional limitations: Ability to work due to need for mobility, yard work, can't walk more than 20 minutes at leisurely pace     PRIOR INJURIES/TREATMENT:   Ice/Heat: ice helps, heat for muscle tightness and helpful   Brace: None recently, wore prior to surgery   Physical Therapy: PT multiple grouped sessions, last was mid 2021 x7 sessions with a focus on knee strengthening and ROM.      - Current Pain Medications -   Nothing    -  Prior/Trialed Pain Medications -   Excedrin - stopped due to nose bleeds  Ibuprofen - does not tolerate, not helpful   Tylenol - does not tolerate, not helpful   Salonpas - limited benefit      Prior Procedures:  Date    Procedure     Improvement (%)  12/19/22 USG R Pes ans bursa w/ steroid 30% x 2 weeks   9/29/22 PRP of the right knee scar tissue 50% x 3 months until fall in 12/2022    Prior Related Surgery:  S/p right knee total arthroplasty by Dr. Burnett on 7/5/2017  S/p right revision total knee arthroplasty by Dr. Burnett on 7/31/2019  S/p right knee manipulation, arthrosocpic synovectomy and lysis of adhesions by Dr. Ortiz on 4/29/2020  S/p right knee open debridement and poly exchange by Dr. Burnett on 6/31/2021  S/p secondary repair of the right knee scar tissue of the anterolateral knee joint line/patella tendon by Dr. Pope on 5/12/2022    Other (acupuncture, OMT, CMM, TENS, DME, etc.):   - TENS not helpful  - Chiropractor helpful   - Not interested in acupuncture     Specialists Seen - (with most recent, available notes and clinic visits reviewed)   1. [facility] - Dr. Naif Pope     IMAGING - reviewed   EXAM: MR KNEE RIGHT wo CONTRAST 12/8/2022  FINDINGS:   MEDIAL COMPARTMENT:   -Obscured by artifact.   LATERAL COMPARTMENT:   -Obscured by artifact.   PATELLOFEMORAL COMPARTMENT:   -Patella midline. No subluxation or tilting.   LIGAMENTS:   -Obscured by artifact.   POSTEROMEDIAL CORNER:   -Distal semimembranosus tendon and pes anserine tendons are intact.   POSTEROLATERAL CORNER:   -Popliteal tendon and biceps femoris tendon are intact.   EXTENSOR MECHANISM:   -Quadriceps tendon: Intact.   -Patellar tendon: Intact.   -Patellofemoral ligaments and retinacula: Intact.   JOINT:   -Small effusion.   BONES:   -Right total knee arthroplasty with longstem femoral and tibial   components. No fracture.     SOFT TISSUES:   -No periprosthetic fluid collection. No acute muscular injury or soft  "  tissue mass.      IMPRESSION:   1.  Postop changes of a right total knee arthroplasty with patellar   resurfacing. No fracture or acute injury.       XR KNEE WB 1 VIEW AP BILATERAL AND 2 VIEWS BILATERAL 1/30/2023   IMPRESSION:   Bilateral total knee replacements. There is no acute periprosthetic fracture or joint effusion on either side. No periprosthetic lucency to suggest hardware loosening.    Review Of Systems:   I am responding to those symptoms which are directly relevant to the specific indication for my consultation. I recommend that the patient follow up with their primary or referring provider to pursue any other symptoms which may be of concern.    Medical History:  Per patient notable for chronic knee pain, cervical DDD, IBS, hypothyroidism, asthma, bronchiectasis     Surgical History:   Bilateral knee TKA, cholecystectomy, hysterectomy, bladder reconstruction ORIF left ankle in 2007    Family History  Her family history is not on file.    Social History:  Work:  with school district   Current living situation: Lives in [place], easily accessible home   She reports that she has never smoked. She does not have any smokeless tobacco history on file. She reports that she does not drink alcohol and does not use drugs.     Current Medications:   She has a current medication list which includes the following prescription(s): clonazepam, levothyroxine sodium, mineral oil-hydrophilic petrolatum, mupirocin, simvastatin, sodium hypochlorite, tramadol hcl, and triamcinolone.     Allergies:    -- Sulfa Antibiotics -- Anaphylaxis   -- Betadine [Povidone Iodine] -- Rash  --  Blisters   -- Iodine -- Rash    PHYSICAL EXAMINATION:  Vitals:    05/12/23 1024   BP: (!) 152/100   BP Location: Left arm   Patient Position: Sitting   Cuff Size: Adult Regular   Pulse: 74   SpO2: 94%   Weight: 119.1 kg (262 lb 8 oz)   Height: 1.727 m (5' 8\")     Exam:  BP (!) 146/99 (BP Location: Left arm, Patient Position: " "Sitting, Cuff Size: Adult Regular)   Pulse 68   Ht 1.727 m (5' 8\")   Wt 119.1 kg (262 lb 8 oz)   SpO2 94%   BMI 39.91 kg/m     General: Pleasant, straightforward, WDWN individual.  Mental Status: Pleasant, direct, appropriate mood and affect  Resp: breathing is unlabored without audible wheeze  Vascular: No visible cyanosis, no venous stasis changes  Heme: No visible ecchymosis or erythema on extremities  Skin: No notable rash     Musculoskeletal exam:  Gait: antalgic on the right   Hip exam: normal internal and external range of motion bilaterally. LAUREL negative. Scour negative.   Knee exam: reduced ROM (lacking 10 deg ext, 50 deg flex), tender diffusely though predominately at medial joint line and infrapatellar region, negative Lachman, Emile, Post. Drawer, Varus/Valgus stress test. Scar noted along anteromedial knee.     Neurologic exam:  Motor Strength:  5/5 symmetric LE strength  Reflexes:  Mute at bilateral patella, 1/4 in achilles   No ankle clonus  Sensory:  (upper and lower extremities):   Light touch: absent in right infrapatellar region, otherwise SILT throughout BLE   Allodynia: absent    Hyperalgesia: absent     ASSESSMENT:  Alissa Escoto is a pleasant 62 year old female who presents with chronic right knee pain.     # Chronic right knee pain. Diffusely located but primarily in the infrapatellar region and medial joint line. Tender to touch in these areas, otherwise objective findings suggestive of further structural instability. Does have limited ROM, present since her initial surgery. Bilateral knee radiographs on 1/30/23 and MRI 12/8/2022 showed stable hardware and no further injury. Has had 5 procedures on the right knee, including more recently PRP to the surgical scar and USG pes anserine bursa steroid injection, all with limited benefit.   # Right knee arthroplasty with multiple revisions   # Left knee arthroplasty     Complicating co-morbidities include:   # Iodine allergy -when " considering interventional procedures  # IBS  # Chronic headaches    PLAN:  - Procedures: Will begin pathway for right genicular nerve radiofrequency ablation.   - Rx: Voltaren gel with application 4 times a day as needed  - Rx: Cymbalta 30 mg daily up to 60mg every day or 30mg BID  - Follow up: Follow up for procedure    Ready to learn, no apparent learning barriers.  Education provided on treatment plan according to patient's preferred learning style.  Patient verbalizes understanding.     Patient was staffed with attending physician, Dr. Rodriguez.   _________________________________  Darci Neville,   PGY-3 Resident   Physical Medicine & Rehabilitation        60 minutes spent by me on the date of the encounter doing chart review, review of outside records, review of test results, patient visit and documentation      I was physically present for the E/M service provided. I agree with the House Officer note and plan, which I have reviewed and edited where appropriate. Alissa Escoto is a pleasant 62 year old female who presents with chronic right knee pain following multiple surgeries including TKA. She has experienced incomplete/inadequate symptomatic relief from multiple pharmacologic trials, repeated courses of physical therapy, interventional pain procedures, and surgeries.    ________________________________   Racquel Rodriguez MD  Department of Physical Medicine & Rehabilitation

## 2023-05-16 ENCOUNTER — TELEPHONE (OUTPATIENT)
Dept: PALLIATIVE MEDICINE | Facility: CLINIC | Age: 63
End: 2023-05-16

## 2023-05-16 NOTE — TELEPHONE ENCOUNTER
Spoke with patient and she does not wish to have her injection at this time due to her work schedule. She will reach out to us in the future if/when she is ready to move forward.

## 2023-08-20 ENCOUNTER — HEALTH MAINTENANCE LETTER (OUTPATIENT)
Age: 63
End: 2023-08-20

## 2024-10-13 ENCOUNTER — HEALTH MAINTENANCE LETTER (OUTPATIENT)
Age: 64
End: 2024-10-13

## 2025-06-14 ENCOUNTER — HEALTH MAINTENANCE LETTER (OUTPATIENT)
Age: 65
End: 2025-06-14